# Patient Record
Sex: FEMALE | Race: OTHER | HISPANIC OR LATINO
[De-identification: names, ages, dates, MRNs, and addresses within clinical notes are randomized per-mention and may not be internally consistent; named-entity substitution may affect disease eponyms.]

---

## 2017-01-05 ENCOUNTER — APPOINTMENT (OUTPATIENT)
Dept: INTERNAL MEDICINE | Facility: CLINIC | Age: 22
End: 2017-01-05

## 2017-01-05 VITALS
DIASTOLIC BLOOD PRESSURE: 79 MMHG | TEMPERATURE: 98.2 F | SYSTOLIC BLOOD PRESSURE: 120 MMHG | HEART RATE: 80 BPM | RESPIRATION RATE: 12 BRPM | HEIGHT: 61 IN | WEIGHT: 184 LBS | OXYGEN SATURATION: 99 % | BODY MASS INDEX: 34.74 KG/M2

## 2017-01-05 DIAGNOSIS — M25.512 PAIN IN LEFT SHOULDER: ICD-10-CM

## 2017-01-05 DIAGNOSIS — J30.9 ALLERGIC RHINITIS, UNSPECIFIED: ICD-10-CM

## 2017-01-05 DIAGNOSIS — R49.0 DYSPHONIA: ICD-10-CM

## 2017-01-05 RX ORDER — AZELASTINE HYDROCHLORIDE 205.5 UG/1
0.15 SPRAY, METERED NASAL 3 TIMES DAILY
Qty: 1 | Refills: 0 | Status: ACTIVE | COMMUNITY
Start: 2017-01-05 | End: 1900-01-01

## 2017-01-05 RX ORDER — MONTELUKAST 10 MG/1
10 TABLET, FILM COATED ORAL DAILY
Qty: 30 | Refills: 0 | Status: ACTIVE | COMMUNITY
Start: 2017-01-05 | End: 1900-01-01

## 2017-01-18 ENCOUNTER — APPOINTMENT (OUTPATIENT)
Dept: ORTHOPEDIC SURGERY | Facility: CLINIC | Age: 22
End: 2017-01-18

## 2017-08-15 ENCOUNTER — EMERGENCY (EMERGENCY)
Facility: HOSPITAL | Age: 22
LOS: 1 days | Discharge: HOME | End: 2017-08-15
Attending: EMERGENCY MEDICINE | Admitting: EMERGENCY MEDICINE
Payer: COMMERCIAL

## 2017-08-15 VITALS
SYSTOLIC BLOOD PRESSURE: 120 MMHG | OXYGEN SATURATION: 98 % | RESPIRATION RATE: 18 BRPM | TEMPERATURE: 98 F | DIASTOLIC BLOOD PRESSURE: 62 MMHG | HEART RATE: 85 BPM

## 2017-08-15 LAB
ALBUMIN SERPL ELPH-MCNC: 4.3 G/DL — SIGNIFICANT CHANGE UP (ref 3.3–5)
ALP SERPL-CCNC: 101 U/L — SIGNIFICANT CHANGE UP (ref 40–120)
ALT FLD-CCNC: 20 U/L — SIGNIFICANT CHANGE UP (ref 4–33)
APPEARANCE UR: CLEAR — SIGNIFICANT CHANGE UP
AST SERPL-CCNC: 22 U/L — SIGNIFICANT CHANGE UP (ref 4–32)
BASE EXCESS BLDV CALC-SCNC: 4.1 MMOL/L — SIGNIFICANT CHANGE UP
BASOPHILS # BLD AUTO: 0.03 K/UL — SIGNIFICANT CHANGE UP (ref 0–0.2)
BASOPHILS NFR BLD AUTO: 0.3 % — SIGNIFICANT CHANGE UP (ref 0–2)
BILIRUB SERPL-MCNC: 0.3 MG/DL — SIGNIFICANT CHANGE UP (ref 0.2–1.2)
BILIRUB UR-MCNC: NEGATIVE — SIGNIFICANT CHANGE UP
BLOOD GAS VENOUS - CREATININE: 0.69 MG/DL — SIGNIFICANT CHANGE UP (ref 0.5–1.3)
BLOOD UR QL VISUAL: NEGATIVE — SIGNIFICANT CHANGE UP
BUN SERPL-MCNC: 13 MG/DL — SIGNIFICANT CHANGE UP (ref 7–23)
CALCIUM SERPL-MCNC: 9.5 MG/DL — SIGNIFICANT CHANGE UP (ref 8.4–10.5)
CHLORIDE BLDV-SCNC: 99 MMOL/L — SIGNIFICANT CHANGE UP (ref 96–108)
CHLORIDE SERPL-SCNC: 99 MMOL/L — SIGNIFICANT CHANGE UP (ref 98–107)
CO2 SERPL-SCNC: 25 MMOL/L — SIGNIFICANT CHANGE UP (ref 22–31)
COLOR SPEC: SIGNIFICANT CHANGE UP
CREAT SERPL-MCNC: 0.7 MG/DL — SIGNIFICANT CHANGE UP (ref 0.5–1.3)
EOSINOPHIL # BLD AUTO: 0.13 K/UL — SIGNIFICANT CHANGE UP (ref 0–0.5)
EOSINOPHIL NFR BLD AUTO: 1.3 % — SIGNIFICANT CHANGE UP (ref 0–6)
GAS PNL BLDV: 137 MMOL/L — SIGNIFICANT CHANGE UP (ref 136–146)
GLUCOSE BLDV-MCNC: 98 — SIGNIFICANT CHANGE UP (ref 70–99)
GLUCOSE SERPL-MCNC: 88 MG/DL — SIGNIFICANT CHANGE UP (ref 70–99)
GLUCOSE UR-MCNC: NEGATIVE — SIGNIFICANT CHANGE UP
HCG UR-SCNC: NEGATIVE — SIGNIFICANT CHANGE UP
HCO3 BLDV-SCNC: 27 MMOL/L — SIGNIFICANT CHANGE UP (ref 20–27)
HCT VFR BLD CALC: 43 % — SIGNIFICANT CHANGE UP (ref 34.5–45)
HCT VFR BLDV CALC: 44.5 % — SIGNIFICANT CHANGE UP (ref 34.5–45)
HGB BLD-MCNC: 14.2 G/DL — SIGNIFICANT CHANGE UP (ref 11.5–15.5)
HGB BLDV-MCNC: 14.5 G/DL — SIGNIFICANT CHANGE UP (ref 11.5–15.5)
IMM GRANULOCYTES # BLD AUTO: 0.06 # — SIGNIFICANT CHANGE UP
IMM GRANULOCYTES NFR BLD AUTO: 0.6 % — SIGNIFICANT CHANGE UP (ref 0–1.5)
KETONES UR-MCNC: NEGATIVE — SIGNIFICANT CHANGE UP
LACTATE BLDV-MCNC: 1.2 MMOL/L — SIGNIFICANT CHANGE UP (ref 0.5–2)
LEUKOCYTE ESTERASE UR-ACNC: NEGATIVE — SIGNIFICANT CHANGE UP
LIDOCAIN IGE QN: 41.1 U/L — SIGNIFICANT CHANGE UP (ref 7–60)
LYMPHOCYTES # BLD AUTO: 2.85 K/UL — SIGNIFICANT CHANGE UP (ref 1–3.3)
LYMPHOCYTES # BLD AUTO: 28.1 % — SIGNIFICANT CHANGE UP (ref 13–44)
MCHC RBC-ENTMCNC: 27.4 PG — SIGNIFICANT CHANGE UP (ref 27–34)
MCHC RBC-ENTMCNC: 33 % — SIGNIFICANT CHANGE UP (ref 32–36)
MCV RBC AUTO: 83 FL — SIGNIFICANT CHANGE UP (ref 80–100)
MONOCYTES # BLD AUTO: 0.57 K/UL — SIGNIFICANT CHANGE UP (ref 0–0.9)
MONOCYTES NFR BLD AUTO: 5.6 % — SIGNIFICANT CHANGE UP (ref 2–14)
MUCOUS THREADS # UR AUTO: SIGNIFICANT CHANGE UP
NEUTROPHILS # BLD AUTO: 6.51 K/UL — SIGNIFICANT CHANGE UP (ref 1.8–7.4)
NEUTROPHILS NFR BLD AUTO: 64.1 % — SIGNIFICANT CHANGE UP (ref 43–77)
NITRITE UR-MCNC: NEGATIVE — SIGNIFICANT CHANGE UP
NRBC # FLD: 0 — SIGNIFICANT CHANGE UP
PCO2 BLDV: 46 MMHG — SIGNIFICANT CHANGE UP (ref 41–51)
PH BLDV: 7.41 PH — SIGNIFICANT CHANGE UP (ref 7.32–7.43)
PH UR: 5.5 — SIGNIFICANT CHANGE UP (ref 4.6–8)
PLATELET # BLD AUTO: 323 K/UL — SIGNIFICANT CHANGE UP (ref 150–400)
PMV BLD: 10.1 FL — SIGNIFICANT CHANGE UP (ref 7–13)
PO2 BLDV: 32 MMHG — LOW (ref 35–40)
POTASSIUM BLDV-SCNC: 3 MMOL/L — LOW (ref 3.4–4.5)
POTASSIUM SERPL-MCNC: 3.4 MMOL/L — LOW (ref 3.5–5.3)
POTASSIUM SERPL-SCNC: 3.4 MMOL/L — LOW (ref 3.5–5.3)
PROT SERPL-MCNC: 7.6 G/DL — SIGNIFICANT CHANGE UP (ref 6–8.3)
PROT UR-MCNC: NEGATIVE — SIGNIFICANT CHANGE UP
RBC # BLD: 5.18 M/UL — SIGNIFICANT CHANGE UP (ref 3.8–5.2)
RBC # FLD: 13.4 % — SIGNIFICANT CHANGE UP (ref 10.3–14.5)
RBC CASTS # UR COMP ASSIST: SIGNIFICANT CHANGE UP (ref 0–?)
SAO2 % BLDV: 57.4 % — LOW (ref 60–85)
SODIUM SERPL-SCNC: 139 MMOL/L — SIGNIFICANT CHANGE UP (ref 135–145)
SP GR SPEC: 1.01 — SIGNIFICANT CHANGE UP (ref 1–1.03)
SQUAMOUS # UR AUTO: SIGNIFICANT CHANGE UP
UROBILINOGEN FLD QL: NORMAL E.U. — SIGNIFICANT CHANGE UP (ref 0.1–0.2)
WBC # BLD: 10.15 K/UL — SIGNIFICANT CHANGE UP (ref 3.8–10.5)
WBC # FLD AUTO: 10.15 K/UL — SIGNIFICANT CHANGE UP (ref 3.8–10.5)
WBC UR QL: SIGNIFICANT CHANGE UP (ref 0–?)

## 2017-08-15 PROCEDURE — 99284 EMERGENCY DEPT VISIT MOD MDM: CPT

## 2017-08-15 PROCEDURE — 74176 CT ABD & PELVIS W/O CONTRAST: CPT | Mod: 26

## 2017-08-15 RX ORDER — KETOROLAC TROMETHAMINE 30 MG/ML
30 SYRINGE (ML) INJECTION ONCE
Qty: 0 | Refills: 0 | Status: DISCONTINUED | OUTPATIENT
Start: 2017-08-15 | End: 2017-08-15

## 2017-08-15 RX ORDER — SODIUM CHLORIDE 9 MG/ML
1000 INJECTION INTRAMUSCULAR; INTRAVENOUS; SUBCUTANEOUS ONCE
Qty: 0 | Refills: 0 | Status: COMPLETED | OUTPATIENT
Start: 2017-08-15 | End: 2017-08-15

## 2017-08-15 RX ORDER — FAMOTIDINE 10 MG/ML
20 INJECTION INTRAVENOUS ONCE
Qty: 0 | Refills: 0 | Status: COMPLETED | OUTPATIENT
Start: 2017-08-15 | End: 2017-08-15

## 2017-08-15 RX ADMIN — Medication 30 MILLIGRAM(S): at 17:45

## 2017-08-15 RX ADMIN — SODIUM CHLORIDE 1000 MILLILITER(S): 9 INJECTION INTRAMUSCULAR; INTRAVENOUS; SUBCUTANEOUS at 15:33

## 2017-08-15 RX ADMIN — Medication 30 MILLIGRAM(S): at 17:30

## 2017-08-15 RX ADMIN — Medication 30 MILLILITER(S): at 15:33

## 2017-08-15 RX ADMIN — FAMOTIDINE 20 MILLIGRAM(S): 10 INJECTION INTRAVENOUS at 15:33

## 2017-08-15 NOTE — ED PROVIDER NOTE - PLAN OF CARE
You were seen in the emergency department today due to stomach pain. You were given fluids and  medications to help with gastritis related symptoms. Your CT scan was negative. Your blood work did not sure an infection. Please follow up with your Primary MD in 24-48 hr. Seek immediate medical care for any new/worsening signs or symptoms including fever, chest pain, shortness of breathing, or worsening abdominal pain.

## 2017-08-15 NOTE — ED PROVIDER NOTE - PROGRESS NOTE DETAILS
Pt reports epigastric pain to be gone. Still having intermittent sharp pain in the LUQ/left flank. Pain 7-10 before, now 6-10. Pending CT.   Radha Onofre, PGY-1 EM

## 2017-08-15 NOTE — ED ADULT NURSE NOTE - OBJECTIVE STATEMENT
Pt is a 22 yo female pmh chronic gastritis LMP 2 weeks ago presenting with epigastric pain and left flank pain that began yesterday. Pt reports that she had a epigastric burning sensation yesterday and is having LUQ sharp pain today that radiates to the flank. 22G IV inserted to right AC. Labs drawn and sent. MD evaluation in progress. Will cont. to monitor.

## 2017-08-15 NOTE — ED PROVIDER NOTE - MEDICAL DECISION MAKING DETAILS
Pt is a 20 y/o with chronic gastritis c/o epigastric pain and left flank pain. Tender on exam in the LUQ/flank, hx and presentation concerning for renal colic; however, no signs of UTI. Less likely splenic pathology including mono-pt has no sx of fatigue, tonsilitis/pharyngitis, fever.   #pain control, maalox/pepsid, fluids, bhcg, lipase/cbc/cmp, non-contrast CT  Radha Onofre, PGY-1 EM

## 2017-08-15 NOTE — ED PROVIDER NOTE - OBJECTIVE STATEMENT
Pt is a 20 yo female pmh chronic gastritis LMP 2 weeks ago presenting with epigastric pain and left flank pain that began yesterday. Pt reports that she had a epigastric burning sensation yesterday and is having LUQ sharp pain today that radiates to the flank. Pt reports this does not feel similar to her episodes of gastritis. Pt reports one episode of non-bloody non-bilious emesis yesterday and 3 more episodes today. Reports she is unable to keep any food down. Reports that Pt is a 20 yo female pmh chronic gastritis LMP 2 weeks ago presenting with epigastric pain and left flank pain that began yesterday. Pt reports that she had a epigastric burning sensation yesterday and is having LUQ sharp pain today that radiates to the flank. Pt reports this does not feel similar to her episodes of gastritis. Pt reports one episode of non-bloody non-bilious emesis yesterday and 3 more episodes today. Reports she is unable to keep any food down. Denies having any urinary sx including hematuria or dysuria. No hx of stones. Denies ha, loc, cp, sob, back pain, diarrhea, recent travel and sickness.

## 2017-08-15 NOTE — ED ADULT TRIAGE NOTE - CHIEF COMPLAINT QUOTE
p/t c/o of diffuse abd pain radiating to back for past few days denies any nausea or vomiting nad noted

## 2017-08-15 NOTE — ED PROVIDER NOTE - ATTENDING CONTRIBUTION TO CARE
AJM: Pt seen with resident and agree with above note. Pt is a 20 yo female pmh chronic gastritis LMP 2 weeks ago presenting with epigastric pain and left flank pain that began yesterday. Pt reports that she had a epigastric burning sensation yesterday and is having LUQ sharp pain today that radiates to the flank. Pt reports this does not feel similar to her episodes of gastritis. Pt reports one episode of non-bloody non-bilious emesis yesterday and 3 more episodes today. Reports she is unable to keep any food down. Denies having any urinary sx including hematuria or dysuria. No hx of stones. + LUQ ttp. + Left cva ttp. labs, ua, ct non con to r/o stone

## 2017-08-15 NOTE — ED PROVIDER NOTE - CARE PLAN
Principal Discharge DX:	Abdominal pain Principal Discharge DX:	Abdominal pain  Instructions for follow-up, activity and diet:	You were seen in the emergency department today due to stomach pain. You were given fluids and  medications to help with gastritis related symptoms. Your CT scan was negative. Your blood work did not sure an infection. Please follow up with your Primary MD in 24-48 hr. Seek immediate medical care for any new/worsening signs or symptoms including fever, chest pain, shortness of breathing, or worsening abdominal pain.

## 2017-08-17 ENCOUNTER — EMERGENCY (EMERGENCY)
Facility: HOSPITAL | Age: 22
LOS: 1 days | Discharge: HOME | End: 2017-08-17
Admitting: EMERGENCY MEDICINE
Payer: COMMERCIAL

## 2017-08-17 VITALS
RESPIRATION RATE: 18 BRPM | DIASTOLIC BLOOD PRESSURE: 86 MMHG | SYSTOLIC BLOOD PRESSURE: 134 MMHG | HEART RATE: 75 BPM | OXYGEN SATURATION: 100 % | TEMPERATURE: 98 F

## 2017-08-17 VITALS
DIASTOLIC BLOOD PRESSURE: 63 MMHG | RESPIRATION RATE: 18 BRPM | HEART RATE: 62 BPM | OXYGEN SATURATION: 100 % | SYSTOLIC BLOOD PRESSURE: 107 MMHG | TEMPERATURE: 98 F

## 2017-08-17 LAB
ALBUMIN SERPL ELPH-MCNC: 4.3 G/DL — SIGNIFICANT CHANGE UP (ref 3.3–5)
ALP SERPL-CCNC: 103 U/L — SIGNIFICANT CHANGE UP (ref 40–120)
ALT FLD-CCNC: 25 U/L — SIGNIFICANT CHANGE UP (ref 4–33)
APPEARANCE UR: CLEAR — SIGNIFICANT CHANGE UP
AST SERPL-CCNC: 27 U/L — SIGNIFICANT CHANGE UP (ref 4–32)
BACTERIA # UR AUTO: SIGNIFICANT CHANGE UP
BASE EXCESS BLDV CALC-SCNC: 0.4 MMOL/L — SIGNIFICANT CHANGE UP
BASOPHILS # BLD AUTO: 0.03 K/UL — SIGNIFICANT CHANGE UP (ref 0–0.2)
BASOPHILS NFR BLD AUTO: 0.3 % — SIGNIFICANT CHANGE UP (ref 0–2)
BILIRUB SERPL-MCNC: 0.3 MG/DL — SIGNIFICANT CHANGE UP (ref 0.2–1.2)
BILIRUB UR-MCNC: NEGATIVE — SIGNIFICANT CHANGE UP
BLOOD GAS VENOUS - CREATININE: 0.56 MG/DL — SIGNIFICANT CHANGE UP (ref 0.5–1.3)
BLOOD UR QL VISUAL: NEGATIVE — SIGNIFICANT CHANGE UP
BUN SERPL-MCNC: 8 MG/DL — SIGNIFICANT CHANGE UP (ref 7–23)
CALCIUM SERPL-MCNC: 9.5 MG/DL — SIGNIFICANT CHANGE UP (ref 8.4–10.5)
CHLORIDE BLDV-SCNC: 103 MMOL/L — SIGNIFICANT CHANGE UP (ref 96–108)
CHLORIDE SERPL-SCNC: 102 MMOL/L — SIGNIFICANT CHANGE UP (ref 98–107)
CO2 SERPL-SCNC: 22 MMOL/L — SIGNIFICANT CHANGE UP (ref 22–31)
COLOR SPEC: SIGNIFICANT CHANGE UP
CREAT SERPL-MCNC: 0.65 MG/DL — SIGNIFICANT CHANGE UP (ref 0.5–1.3)
EOSINOPHIL # BLD AUTO: 0.17 K/UL — SIGNIFICANT CHANGE UP (ref 0–0.5)
EOSINOPHIL NFR BLD AUTO: 1.6 % — SIGNIFICANT CHANGE UP (ref 0–6)
GAS PNL BLDV: 139 MMOL/L — SIGNIFICANT CHANGE UP (ref 136–146)
GLUCOSE BLDV-MCNC: 80 — SIGNIFICANT CHANGE UP (ref 70–99)
GLUCOSE SERPL-MCNC: 76 MG/DL — SIGNIFICANT CHANGE UP (ref 70–99)
GLUCOSE UR-MCNC: NEGATIVE — SIGNIFICANT CHANGE UP
HCO3 BLDV-SCNC: 24 MMOL/L — SIGNIFICANT CHANGE UP (ref 20–27)
HCT VFR BLD CALC: 43.1 % — SIGNIFICANT CHANGE UP (ref 34.5–45)
HCT VFR BLDV CALC: 44.1 % — SIGNIFICANT CHANGE UP (ref 34.5–45)
HGB BLD-MCNC: 14.2 G/DL — SIGNIFICANT CHANGE UP (ref 11.5–15.5)
HGB BLDV-MCNC: 14.4 G/DL — SIGNIFICANT CHANGE UP (ref 11.5–15.5)
IMM GRANULOCYTES # BLD AUTO: 0.05 # — SIGNIFICANT CHANGE UP
IMM GRANULOCYTES NFR BLD AUTO: 0.5 % — SIGNIFICANT CHANGE UP (ref 0–1.5)
KETONES UR-MCNC: NEGATIVE — SIGNIFICANT CHANGE UP
LACTATE BLDV-MCNC: 1.8 MMOL/L — SIGNIFICANT CHANGE UP (ref 0.5–2)
LEUKOCYTE ESTERASE UR-ACNC: NEGATIVE — SIGNIFICANT CHANGE UP
LIDOCAIN IGE QN: 48.9 U/L — SIGNIFICANT CHANGE UP (ref 7–60)
LYMPHOCYTES # BLD AUTO: 3.34 K/UL — HIGH (ref 1–3.3)
LYMPHOCYTES # BLD AUTO: 31.3 % — SIGNIFICANT CHANGE UP (ref 13–44)
MCHC RBC-ENTMCNC: 28.1 PG — SIGNIFICANT CHANGE UP (ref 27–34)
MCHC RBC-ENTMCNC: 32.9 % — SIGNIFICANT CHANGE UP (ref 32–36)
MCV RBC AUTO: 85.3 FL — SIGNIFICANT CHANGE UP (ref 80–100)
MONOCYTES # BLD AUTO: 0.56 K/UL — SIGNIFICANT CHANGE UP (ref 0–0.9)
MONOCYTES NFR BLD AUTO: 5.2 % — SIGNIFICANT CHANGE UP (ref 2–14)
MUCOUS THREADS # UR AUTO: SIGNIFICANT CHANGE UP
NEUTROPHILS # BLD AUTO: 6.53 K/UL — SIGNIFICANT CHANGE UP (ref 1.8–7.4)
NEUTROPHILS NFR BLD AUTO: 61.1 % — SIGNIFICANT CHANGE UP (ref 43–77)
NITRITE UR-MCNC: NEGATIVE — SIGNIFICANT CHANGE UP
NON-SQ EPI CELLS # UR AUTO: <1 — SIGNIFICANT CHANGE UP
NRBC # FLD: 0 — SIGNIFICANT CHANGE UP
PCO2 BLDV: 48 MMHG — SIGNIFICANT CHANGE UP (ref 41–51)
PH BLDV: 7.35 PH — SIGNIFICANT CHANGE UP (ref 7.32–7.43)
PH UR: 6 — SIGNIFICANT CHANGE UP (ref 4.6–8)
PLATELET # BLD AUTO: 339 K/UL — SIGNIFICANT CHANGE UP (ref 150–400)
PMV BLD: 10.6 FL — SIGNIFICANT CHANGE UP (ref 7–13)
PO2 BLDV: 37 MMHG — SIGNIFICANT CHANGE UP (ref 35–40)
POTASSIUM BLDV-SCNC: 3.4 MMOL/L — SIGNIFICANT CHANGE UP (ref 3.4–4.5)
POTASSIUM SERPL-MCNC: 3.8 MMOL/L — SIGNIFICANT CHANGE UP (ref 3.5–5.3)
POTASSIUM SERPL-SCNC: 3.8 MMOL/L — SIGNIFICANT CHANGE UP (ref 3.5–5.3)
PROT SERPL-MCNC: 7.8 G/DL — SIGNIFICANT CHANGE UP (ref 6–8.3)
PROT UR-MCNC: NEGATIVE — SIGNIFICANT CHANGE UP
RBC # BLD: 5.05 M/UL — SIGNIFICANT CHANGE UP (ref 3.8–5.2)
RBC # FLD: 13.5 % — SIGNIFICANT CHANGE UP (ref 10.3–14.5)
RBC CASTS # UR COMP ASSIST: SIGNIFICANT CHANGE UP (ref 0–?)
SAO2 % BLDV: 69.2 % — SIGNIFICANT CHANGE UP (ref 60–85)
SODIUM SERPL-SCNC: 141 MMOL/L — SIGNIFICANT CHANGE UP (ref 135–145)
SP GR SPEC: 1.01 — SIGNIFICANT CHANGE UP (ref 1–1.03)
SQUAMOUS # UR AUTO: SIGNIFICANT CHANGE UP
UROBILINOGEN FLD QL: NORMAL E.U. — SIGNIFICANT CHANGE UP (ref 0.1–0.2)
WBC # BLD: 10.68 K/UL — HIGH (ref 3.8–10.5)
WBC # FLD AUTO: 10.68 K/UL — HIGH (ref 3.8–10.5)
WBC UR QL: SIGNIFICANT CHANGE UP (ref 0–?)

## 2017-08-17 PROCEDURE — 99285 EMERGENCY DEPT VISIT HI MDM: CPT

## 2017-08-17 PROCEDURE — 76705 ECHO EXAM OF ABDOMEN: CPT | Mod: 26

## 2017-08-17 RX ORDER — MORPHINE SULFATE 50 MG/1
2 CAPSULE, EXTENDED RELEASE ORAL ONCE
Qty: 0 | Refills: 0 | Status: DISCONTINUED | OUTPATIENT
Start: 2017-08-17 | End: 2017-08-17

## 2017-08-17 RX ORDER — LIDOCAINE 4 G/100G
10 CREAM TOPICAL ONCE
Qty: 0 | Refills: 0 | Status: COMPLETED | OUTPATIENT
Start: 2017-08-17 | End: 2017-08-17

## 2017-08-17 RX ORDER — FAMOTIDINE 10 MG/ML
20 INJECTION INTRAVENOUS ONCE
Qty: 0 | Refills: 0 | Status: COMPLETED | OUTPATIENT
Start: 2017-08-17 | End: 2017-08-17

## 2017-08-17 RX ORDER — OMEPRAZOLE 10 MG/1
1 CAPSULE, DELAYED RELEASE ORAL
Qty: 30 | Refills: 0
Start: 2017-08-17 | End: 2017-09-16

## 2017-08-17 RX ADMIN — MORPHINE SULFATE 2 MILLIGRAM(S): 50 CAPSULE, EXTENDED RELEASE ORAL at 21:57

## 2017-08-17 RX ADMIN — Medication 10 MILLILITER(S): at 18:50

## 2017-08-17 RX ADMIN — MORPHINE SULFATE 2 MILLIGRAM(S): 50 CAPSULE, EXTENDED RELEASE ORAL at 21:42

## 2017-08-17 RX ADMIN — FAMOTIDINE 20 MILLIGRAM(S): 10 INJECTION INTRAVENOUS at 18:51

## 2017-08-17 RX ADMIN — LIDOCAINE 10 MILLILITER(S): 4 CREAM TOPICAL at 18:50

## 2017-08-17 RX ADMIN — Medication 30 MILLILITER(S): at 18:49

## 2017-08-17 NOTE — ED PROVIDER NOTE - OBJECTIVE STATEMENT
Pt is 22 y/o female with PMhx of gastritis here for eval of epigastric pain x 1 week. Pt states that the pain is dull, constant, not related to eating, she had similar pain in the past and was advised that she has gastritis. Pt is concerned though as since yesterday she has had multiple episodes of NB/NB emesis, also worsening pain, denies fever, chills, dysuria, urinary urgency or frequency, denies cp, sob, melena, NSAIds use. Pt was recently seen and evaluated in the ER for the same, had neg labs as well as Ct abd/pelvis, no meds given. Pt never saw GI nor was she scoped. pcp - Dr Haile. LMP - 2 wks ago. (-) diarrhea/constipation, denies hx of intrabd sx.

## 2017-08-17 NOTE — ED PROVIDER NOTE - PLAN OF CARE
Please make sure that you take medications as advised, avoid spicy food, non-steroids antinflammatory medications,  alcohol, cigarettes, chocolate. Follow up with gastroenterologist within next 7 days. Return to ER for worsening pain, persistent vomiting, chest pain, shortness of breath.

## 2017-08-17 NOTE — ED PROVIDER NOTE - CARE PLAN
Instructions for follow-up, activity and diet:	Please make sure that you take medications as advised, avoid spicy food, non-steroids antinflammatory medications,  alcohol, cigarettes, chocolate. Follow up with gastroenterologist within next 7 days. Return to ER for worsening pain, persistent vomiting, chest pain, shortness of breath. Principal Discharge DX:	Abdominal pain  Instructions for follow-up, activity and diet:	Please make sure that you take medications as advised, avoid spicy food, non-steroids antinflammatory medications,  alcohol, cigarettes, chocolate. Follow up with gastroenterologist within next 7 days. Return to ER for worsening pain, persistent vomiting, chest pain, shortness of breath.

## 2017-08-17 NOTE — ED ADULT TRIAGE NOTE - CHIEF COMPLAINT QUOTE
Pt c/o epigastric pain x 1 week, was seen 2 days ago for same symptoms but states that pain got worse. Also c/o N/V x 5 episodes today. Denies diarrhea, fever/chills. LMP 2 weeks ago.

## 2017-08-17 NOTE — ED ADULT NURSE NOTE - CHPI ED SYMPTOMS NEG
no vomiting/no fever/no diarrhea/no chills/no dysuria/no abdominal distension/no nausea/no burning urination/no hematuria/no blood in stool

## 2017-08-31 ENCOUNTER — APPOINTMENT (OUTPATIENT)
Dept: INTERNAL MEDICINE | Facility: CLINIC | Age: 22
End: 2017-08-31
Payer: COMMERCIAL

## 2017-08-31 VITALS
SYSTOLIC BLOOD PRESSURE: 100 MMHG | OXYGEN SATURATION: 94 % | BODY MASS INDEX: 36.06 KG/M2 | HEIGHT: 61 IN | RESPIRATION RATE: 12 BRPM | HEART RATE: 61 BPM | TEMPERATURE: 98.4 F | WEIGHT: 191 LBS | DIASTOLIC BLOOD PRESSURE: 66 MMHG

## 2017-08-31 DIAGNOSIS — K21.9 GASTRO-ESOPHAGEAL REFLUX DISEASE W/OUT ESOPHAGITIS: ICD-10-CM

## 2017-08-31 DIAGNOSIS — R11.2 NAUSEA WITH VOMITING, UNSPECIFIED: ICD-10-CM

## 2017-08-31 PROCEDURE — 99214 OFFICE O/P EST MOD 30 MIN: CPT

## 2017-09-02 LAB
CHOLEST SERPL-MCNC: 136 MG/DL
CHOLEST/HDLC SERPL: 2.8 RATIO
HDLC SERPL-MCNC: 49 MG/DL
LDLC SERPL CALC-MCNC: 68 MG/DL
T4 FREE SERPL-MCNC: 1.4 NG/DL
TRIGL SERPL-MCNC: 97 MG/DL
TSH SERPL-ACNC: 0.55 UIU/ML

## 2017-09-05 LAB — 25(OH)D3 SERPL-MCNC: 22.4 NG/ML

## 2017-09-08 ENCOUNTER — APPOINTMENT (OUTPATIENT)
Dept: GASTROENTEROLOGY | Facility: CLINIC | Age: 22
End: 2017-09-08

## 2018-06-05 ENCOUNTER — APPOINTMENT (OUTPATIENT)
Dept: INTERNAL MEDICINE | Facility: CLINIC | Age: 23
End: 2018-06-05
Payer: COMMERCIAL

## 2018-06-05 VITALS
DIASTOLIC BLOOD PRESSURE: 80 MMHG | WEIGHT: 179 LBS | HEART RATE: 63 BPM | TEMPERATURE: 98.4 F | SYSTOLIC BLOOD PRESSURE: 117 MMHG | HEIGHT: 61 IN | OXYGEN SATURATION: 100 % | BODY MASS INDEX: 33.79 KG/M2 | RESPIRATION RATE: 12 BRPM

## 2018-06-05 DIAGNOSIS — H92.11 OTORRHEA, RIGHT EAR: ICD-10-CM

## 2018-06-05 DIAGNOSIS — H92.10 OTORRHEA, UNSPECIFIED EAR: ICD-10-CM

## 2018-06-05 DIAGNOSIS — H81.10 BENIGN PAROXYSMAL VERTIGO, UNSPECIFIED EAR: ICD-10-CM

## 2018-06-05 LAB
BASOPHILS # BLD AUTO: 0.01 K/UL
BASOPHILS NFR BLD AUTO: 0.2 %
EOSINOPHIL # BLD AUTO: 0.08 K/UL
EOSINOPHIL NFR BLD AUTO: 1.2 %
HCT VFR BLD CALC: 43.2 %
HGB BLD-MCNC: 13.8 G/DL
IMM GRANULOCYTES NFR BLD AUTO: 0.3 %
LYMPHOCYTES # BLD AUTO: 2.43 K/UL
LYMPHOCYTES NFR BLD AUTO: 37.9 %
MAN DIFF?: NORMAL
MCHC RBC-ENTMCNC: 28.4 PG
MCHC RBC-ENTMCNC: 31.9 GM/DL
MCV RBC AUTO: 88.9 FL
MONOCYTES # BLD AUTO: 0.42 K/UL
MONOCYTES NFR BLD AUTO: 6.6 %
NEUTROPHILS # BLD AUTO: 3.45 K/UL
NEUTROPHILS NFR BLD AUTO: 53.8 %
PLATELET # BLD AUTO: 304 K/UL
RBC # BLD: 4.86 M/UL
RBC # FLD: 13.7 %
WBC # FLD AUTO: 6.41 K/UL

## 2018-06-05 PROCEDURE — 99213 OFFICE O/P EST LOW 20 MIN: CPT

## 2018-06-05 PROCEDURE — 99395 PREV VISIT EST AGE 18-39: CPT
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
## 2018-06-07 LAB
ALBUMIN SERPL ELPH-MCNC: 4.4 G/DL
ALP BLD-CCNC: 86 U/L
ALT SERPL-CCNC: 14 U/L
ANION GAP SERPL CALC-SCNC: 18 MMOL/L
APPEARANCE: CLEAR
AST SERPL-CCNC: 22 U/L
BILIRUB SERPL-MCNC: 0.3 MG/DL
BILIRUBIN URINE: NEGATIVE
BLOOD URINE: NEGATIVE
BUN SERPL-MCNC: 11 MG/DL
CALCIUM SERPL-MCNC: 9.7 MG/DL
CHLORIDE SERPL-SCNC: 105 MMOL/L
CHOLEST SERPL-MCNC: 104 MG/DL
CHOLEST/HDLC SERPL: 2.3 RATIO
CO2 SERPL-SCNC: 17 MMOL/L
COLOR: YELLOW
CREAT SERPL-MCNC: 0.69 MG/DL
GLUCOSE QUALITATIVE U: NEGATIVE MG/DL
GLUCOSE SERPL-MCNC: 58 MG/DL
HDLC SERPL-MCNC: 46 MG/DL
KETONES URINE: NEGATIVE
LDLC SERPL CALC-MCNC: 45 MG/DL
LEUKOCYTE ESTERASE URINE: NEGATIVE
NITRITE URINE: NEGATIVE
PH URINE: 5
POTASSIUM SERPL-SCNC: 3.9 MMOL/L
PROT SERPL-MCNC: 7.6 G/DL
PROTEIN URINE: NEGATIVE MG/DL
SODIUM SERPL-SCNC: 140 MMOL/L
SPECIFIC GRAVITY URINE: 1.02
TRIGL SERPL-MCNC: 66 MG/DL
TSH SERPL-ACNC: 1.09 UIU/ML
UROBILINOGEN URINE: NEGATIVE MG/DL

## 2018-06-11 DIAGNOSIS — R76.12 NONSPECIFIC REACTION TO CELL MEDIATED IMMUNITY MEASUREMENT OF GAMMA INTERFERON ANTIGEN RESPONSE W/OUT ACTIVE TUBERCULOSIS: ICD-10-CM

## 2018-06-21 PROBLEM — R76.12 POSITIVE QUANTIFERON-TB GOLD TEST: Status: ACTIVE | Noted: 2018-06-21

## 2018-06-21 LAB
25(OH)D3 SERPL-MCNC: 22.5 NG/ML
ADJUSTED MITOGEN: >10 IU/ML
ADJUSTED TB AG: 0.56 IU/ML
M TB IFN-G BLD-IMP: POSITIVE
QUANTIFERON GOLD NIL: 0.06 IU/ML

## 2018-07-03 ENCOUNTER — APPOINTMENT (OUTPATIENT)
Dept: INTERNAL MEDICINE | Facility: CLINIC | Age: 23
End: 2018-07-03
Payer: COMMERCIAL

## 2018-07-03 VITALS
HEIGHT: 61 IN | DIASTOLIC BLOOD PRESSURE: 75 MMHG | WEIGHT: 181 LBS | OXYGEN SATURATION: 98 % | TEMPERATURE: 99 F | RESPIRATION RATE: 12 BRPM | HEART RATE: 70 BPM | SYSTOLIC BLOOD PRESSURE: 112 MMHG | BODY MASS INDEX: 34.17 KG/M2

## 2018-07-03 DIAGNOSIS — R76.11 NONSPECIFIC REACTION TO TUBERCULIN SKIN TEST W/OUT ACTIVE TUBERCULOSIS: ICD-10-CM

## 2018-07-03 PROCEDURE — 99213 OFFICE O/P EST LOW 20 MIN: CPT

## 2018-07-03 RX ORDER — ISONIAZID 300 MG/1
300 TABLET ORAL DAILY
Qty: 90 | Refills: 0 | Status: DISCONTINUED | COMMUNITY
Start: 2018-07-03 | End: 2018-07-03
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
## 2018-07-03 NOTE — PHYSICAL EXAM
[No Acute Distress] : no acute distress [Well Nourished] : well nourished [Well Developed] : well developed [No Respiratory Distress] : no respiratory distress  [Clear to Auscultation] : lungs were clear to auscultation bilaterally [No Accessory Muscle Use] : no accessory muscle use

## 2018-08-13 ENCOUNTER — MEDICATION RENEWAL (OUTPATIENT)
Age: 23
End: 2018-08-13

## 2020-06-04 ENCOUNTER — TRANSCRIPTION ENCOUNTER (OUTPATIENT)
Age: 25
End: 2020-06-04

## 2020-06-04 PROBLEM — K29.70 GASTRITIS, UNSPECIFIED, WITHOUT BLEEDING: Chronic | Status: ACTIVE | Noted: 2017-08-15

## 2020-07-16 ENCOUNTER — APPOINTMENT (OUTPATIENT)
Dept: INTERNAL MEDICINE | Facility: CLINIC | Age: 25
End: 2020-07-16
Payer: COMMERCIAL

## 2020-07-16 VITALS
RESPIRATION RATE: 17 BRPM | SYSTOLIC BLOOD PRESSURE: 111 MMHG | WEIGHT: 210 LBS | OXYGEN SATURATION: 97 % | TEMPERATURE: 98.1 F | DIASTOLIC BLOOD PRESSURE: 76 MMHG | HEART RATE: 72 BPM

## 2020-07-16 DIAGNOSIS — Z00.00 ENCOUNTER FOR GENERAL ADULT MEDICAL EXAMINATION W/OUT ABNORMAL FINDINGS: ICD-10-CM

## 2020-07-16 DIAGNOSIS — E66.9 OBESITY, UNSPECIFIED: ICD-10-CM

## 2020-07-16 PROCEDURE — 99213 OFFICE O/P EST LOW 20 MIN: CPT

## 2020-07-16 PROCEDURE — 99395 PREV VISIT EST AGE 18-39: CPT | Mod: 25

## 2020-07-16 RX ORDER — RIFAMPIN 300 MG/1
300 CAPSULE ORAL DAILY
Qty: 120 | Refills: 0 | Status: DISCONTINUED | COMMUNITY
Start: 2018-07-03 | End: 2020-07-16

## 2020-07-16 NOTE — REVIEW OF SYSTEMS
[Recent Change In Weight] : ~T recent weight change [Dysmenorrhea] : dysmenorrhea [Muscle Pain] : muscle pain [Negative] : Heme/Lymph

## 2020-07-16 NOTE — ASSESSMENT
[FreeTextEntry1] : CPE OF 24 Y OLD FEM WITH WORSENING OBESITY = LABS,SEE DIETICIAN \par IRREGULAR MENSES= TSH ,LH AND TESTOSTERONE,FU GYN \par ? CTS= RHEUMA AND NEURO

## 2020-07-16 NOTE — PHYSICAL EXAM
[No Acute Distress] : no acute distress [Well-Appearing] : well-appearing [Well Developed] : well developed [Well Nourished] : well nourished [PERRL] : pupils equal round and reactive to light [EOMI] : extraocular movements intact [Normal Sclera/Conjunctiva] : normal sclera/conjunctiva [Normal Oropharynx] : the oropharynx was normal [Normal Outer Ear/Nose] : the outer ears and nose were normal in appearance [No JVD] : no jugular venous distention [No Lymphadenopathy] : no lymphadenopathy [Supple] : supple [Thyroid Normal, No Nodules] : the thyroid was normal and there were no nodules present [Clear to Auscultation] : lungs were clear to auscultation bilaterally [No Respiratory Distress] : no respiratory distress  [No Accessory Muscle Use] : no accessory muscle use [Normal Rate] : normal rate  [Regular Rhythm] : with a regular rhythm [No Murmur] : no murmur heard [Normal S1, S2] : normal S1 and S2 [No Varicosities] : no varicosities [No Abdominal Bruit] : a ~M bruit was not heard ~T in the abdomen [No Carotid Bruits] : no carotid bruits [No Palpable Aorta] : no palpable aorta [No Edema] : there was no peripheral edema [Pedal Pulses Present] : the pedal pulses are present [No Extremity Clubbing/Cyanosis] : no extremity clubbing/cyanosis [Non Tender] : non-tender [Soft] : abdomen soft [Non-distended] : non-distended [No HSM] : no HSM [No Masses] : no abdominal mass palpated [Normal Bowel Sounds] : normal bowel sounds [Normal Posterior Cervical Nodes] : no posterior cervical lymphadenopathy [Normal Anterior Cervical Nodes] : no anterior cervical lymphadenopathy [No CVA Tenderness] : no CVA  tenderness [No Spinal Tenderness] : no spinal tenderness [No Rash] : no rash [No Joint Swelling] : no joint swelling [Grossly Normal Strength/Tone] : grossly normal strength/tone [No Focal Deficits] : no focal deficits [Coordination Grossly Intact] : coordination grossly intact [Normal Gait] : normal gait [Normal Insight/Judgement] : insight and judgment were intact [Deep Tendon Reflexes (DTR)] : deep tendon reflexes were 2+ and symmetric [Normal Affect] : the affect was normal [de-identified] : OBESE
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
## 2020-07-17 LAB
25(OH)D3 SERPL-MCNC: 16.7 NG/ML
ALBUMIN SERPL ELPH-MCNC: 4.6 G/DL
ALP BLD-CCNC: 115 U/L
ALT SERPL-CCNC: 24 U/L
ANION GAP SERPL CALC-SCNC: 19 MMOL/L
AST SERPL-CCNC: 23 U/L
BASOPHILS # BLD AUTO: 0.04 K/UL
BASOPHILS NFR BLD AUTO: 0.4 %
BILIRUB SERPL-MCNC: 0.2 MG/DL
BUN SERPL-MCNC: 10 MG/DL
CALCIUM SERPL-MCNC: 9.3 MG/DL
CHLORIDE SERPL-SCNC: 106 MMOL/L
CO2 SERPL-SCNC: 20 MMOL/L
CREAT SERPL-MCNC: 0.68 MG/DL
EOSINOPHIL # BLD AUTO: 0.19 K/UL
EOSINOPHIL NFR BLD AUTO: 2.1 %
FSH SERPL-MCNC: 5.3 IU/L
GLUCOSE SERPL-MCNC: 76 MG/DL
HCT VFR BLD CALC: 44.2 %
HGB BLD-MCNC: 13.4 G/DL
IMM GRANULOCYTES NFR BLD AUTO: 0.6 %
LH SERPL-ACNC: 6 IU/L
LYMPHOCYTES # BLD AUTO: 2.89 K/UL
LYMPHOCYTES NFR BLD AUTO: 31.9 %
MAN DIFF?: NORMAL
MCHC RBC-ENTMCNC: 28 PG
MCHC RBC-ENTMCNC: 30.3 GM/DL
MCV RBC AUTO: 92.5 FL
MONOCYTES # BLD AUTO: 0.58 K/UL
MONOCYTES NFR BLD AUTO: 6.4 %
NEUTROPHILS # BLD AUTO: 5.32 K/UL
NEUTROPHILS NFR BLD AUTO: 58.6 %
PLATELET # BLD AUTO: 356 K/UL
POTASSIUM SERPL-SCNC: 4 MMOL/L
PROT SERPL-MCNC: 7.6 G/DL
RBC # BLD: 4.78 M/UL
RBC # FLD: 13.1 %
SODIUM SERPL-SCNC: 145 MMOL/L
TSH SERPL-ACNC: 0.79 UIU/ML
VIT B12 SERPL-MCNC: 490 PG/ML
WBC # FLD AUTO: 9.07 K/UL

## 2020-07-20 LAB
TESTOST BND SERPL-MCNC: 0.9 PG/ML
TESTOST SERPL-MCNC: 15.1 NG/DL

## 2020-07-22 LAB
APPEARANCE: CLEAR
BILIRUBIN URINE: NEGATIVE
BLOOD URINE: NEGATIVE
CHOLEST SERPL-MCNC: 146 MG/DL
CHOLEST/HDLC SERPL: 3.1 RATIO
COLOR: YELLOW
GLUCOSE QUALITATIVE U: NEGATIVE
HDLC SERPL-MCNC: 47 MG/DL
KETONES URINE: NEGATIVE
LDLC SERPL CALC-MCNC: 60 MG/DL
LEUKOCYTE ESTERASE URINE: NEGATIVE
NITRITE URINE: NEGATIVE
PH URINE: 6
PROTEIN URINE: NORMAL
SPECIFIC GRAVITY URINE: 1.03
TRIGL SERPL-MCNC: 193 MG/DL
UROBILINOGEN URINE: NORMAL

## 2020-08-30 ENCOUNTER — EMERGENCY (EMERGENCY)
Facility: HOSPITAL | Age: 25
LOS: 1 days | Discharge: HOME | End: 2020-08-30
Attending: EMERGENCY MEDICINE
Payer: COMMERCIAL

## 2020-08-30 VITALS
TEMPERATURE: 99 F | OXYGEN SATURATION: 97 % | DIASTOLIC BLOOD PRESSURE: 78 MMHG | RESPIRATION RATE: 18 BRPM | SYSTOLIC BLOOD PRESSURE: 111 MMHG | HEART RATE: 86 BPM

## 2020-08-30 VITALS
HEART RATE: 108 BPM | SYSTOLIC BLOOD PRESSURE: 145 MMHG | TEMPERATURE: 99 F | DIASTOLIC BLOOD PRESSURE: 78 MMHG | WEIGHT: 210.1 LBS | OXYGEN SATURATION: 99 % | RESPIRATION RATE: 18 BRPM | HEIGHT: 61 IN

## 2020-08-30 LAB
ALBUMIN SERPL ELPH-MCNC: 3.7 G/DL — SIGNIFICANT CHANGE UP (ref 3.5–5)
ALP SERPL-CCNC: 110 U/L — SIGNIFICANT CHANGE UP (ref 40–120)
ALT FLD-CCNC: 27 U/L DA — SIGNIFICANT CHANGE UP (ref 10–60)
ANION GAP SERPL CALC-SCNC: 2 MMOL/L — LOW (ref 5–17)
AST SERPL-CCNC: 15 U/L — SIGNIFICANT CHANGE UP (ref 10–40)
BASOPHILS # BLD AUTO: 0.03 K/UL — SIGNIFICANT CHANGE UP (ref 0–0.2)
BASOPHILS NFR BLD AUTO: 0.3 % — SIGNIFICANT CHANGE UP (ref 0–2)
BILIRUB SERPL-MCNC: 0.3 MG/DL — SIGNIFICANT CHANGE UP (ref 0.2–1.2)
BUN SERPL-MCNC: 7 MG/DL — SIGNIFICANT CHANGE UP (ref 7–18)
CALCIUM SERPL-MCNC: 8.9 MG/DL — SIGNIFICANT CHANGE UP (ref 8.4–10.5)
CHLORIDE SERPL-SCNC: 113 MMOL/L — HIGH (ref 96–108)
CO2 SERPL-SCNC: 26 MMOL/L — SIGNIFICANT CHANGE UP (ref 22–31)
CREAT SERPL-MCNC: 0.76 MG/DL — SIGNIFICANT CHANGE UP (ref 0.5–1.3)
EOSINOPHIL # BLD AUTO: 0.11 K/UL — SIGNIFICANT CHANGE UP (ref 0–0.5)
EOSINOPHIL NFR BLD AUTO: 1.1 % — SIGNIFICANT CHANGE UP (ref 0–6)
GLUCOSE SERPL-MCNC: 91 MG/DL — SIGNIFICANT CHANGE UP (ref 70–99)
HCG SERPL-ACNC: <1 MIU/ML — SIGNIFICANT CHANGE UP
HCT VFR BLD CALC: 43.1 % — SIGNIFICANT CHANGE UP (ref 34.5–45)
HGB BLD-MCNC: 14.2 G/DL — SIGNIFICANT CHANGE UP (ref 11.5–15.5)
IMM GRANULOCYTES NFR BLD AUTO: 0.4 % — SIGNIFICANT CHANGE UP (ref 0–1.5)
LYMPHOCYTES # BLD AUTO: 2.18 K/UL — SIGNIFICANT CHANGE UP (ref 1–3.3)
LYMPHOCYTES # BLD AUTO: 21.9 % — SIGNIFICANT CHANGE UP (ref 13–44)
MCHC RBC-ENTMCNC: 28.6 PG — SIGNIFICANT CHANGE UP (ref 27–34)
MCHC RBC-ENTMCNC: 32.9 GM/DL — SIGNIFICANT CHANGE UP (ref 32–36)
MCV RBC AUTO: 86.9 FL — SIGNIFICANT CHANGE UP (ref 80–100)
MONOCYTES # BLD AUTO: 0.61 K/UL — SIGNIFICANT CHANGE UP (ref 0–0.9)
MONOCYTES NFR BLD AUTO: 6.1 % — SIGNIFICANT CHANGE UP (ref 2–14)
NEUTROPHILS # BLD AUTO: 6.97 K/UL — SIGNIFICANT CHANGE UP (ref 1.8–7.4)
NEUTROPHILS NFR BLD AUTO: 70.2 % — SIGNIFICANT CHANGE UP (ref 43–77)
NRBC # BLD: 0 /100 WBCS — SIGNIFICANT CHANGE UP (ref 0–0)
PLATELET # BLD AUTO: 322 K/UL — SIGNIFICANT CHANGE UP (ref 150–400)
POTASSIUM SERPL-MCNC: 3.6 MMOL/L — SIGNIFICANT CHANGE UP (ref 3.5–5.3)
POTASSIUM SERPL-SCNC: 3.6 MMOL/L — SIGNIFICANT CHANGE UP (ref 3.5–5.3)
PROT SERPL-MCNC: 7.9 G/DL — SIGNIFICANT CHANGE UP (ref 6–8.3)
RBC # BLD: 4.96 M/UL — SIGNIFICANT CHANGE UP (ref 3.8–5.2)
RBC # FLD: 13 % — SIGNIFICANT CHANGE UP (ref 10.3–14.5)
SODIUM SERPL-SCNC: 141 MMOL/L — SIGNIFICANT CHANGE UP (ref 135–145)
WBC # BLD: 9.94 K/UL — SIGNIFICANT CHANGE UP (ref 3.8–10.5)
WBC # FLD AUTO: 9.94 K/UL — SIGNIFICANT CHANGE UP (ref 3.8–10.5)

## 2020-08-30 PROCEDURE — 99285 EMERGENCY DEPT VISIT HI MDM: CPT | Mod: 25

## 2020-08-30 PROCEDURE — 96374 THER/PROPH/DIAG INJ IV PUSH: CPT

## 2020-08-30 PROCEDURE — 96375 TX/PRO/DX INJ NEW DRUG ADDON: CPT

## 2020-08-30 PROCEDURE — 80053 COMPREHEN METABOLIC PANEL: CPT

## 2020-08-30 PROCEDURE — 84702 CHORIONIC GONADOTROPIN TEST: CPT

## 2020-08-30 PROCEDURE — 99284 EMERGENCY DEPT VISIT MOD MDM: CPT | Mod: 25

## 2020-08-30 PROCEDURE — 70450 CT HEAD/BRAIN W/O DYE: CPT | Mod: 26

## 2020-08-30 PROCEDURE — 93005 ELECTROCARDIOGRAM TRACING: CPT

## 2020-08-30 PROCEDURE — 85027 COMPLETE CBC AUTOMATED: CPT

## 2020-08-30 PROCEDURE — 82962 GLUCOSE BLOOD TEST: CPT

## 2020-08-30 PROCEDURE — 70450 CT HEAD/BRAIN W/O DYE: CPT

## 2020-08-30 PROCEDURE — 36415 COLL VENOUS BLD VENIPUNCTURE: CPT

## 2020-08-30 RX ORDER — KETOROLAC TROMETHAMINE 30 MG/ML
15 SYRINGE (ML) INJECTION ONCE
Refills: 0 | Status: DISCONTINUED | OUTPATIENT
Start: 2020-08-30 | End: 2020-08-30

## 2020-08-30 RX ORDER — ACETAMINOPHEN 500 MG
650 TABLET ORAL ONCE
Refills: 0 | Status: COMPLETED | OUTPATIENT
Start: 2020-08-30 | End: 2020-08-30

## 2020-08-30 RX ORDER — DEXAMETHASONE 0.5 MG/5ML
6 ELIXIR ORAL ONCE
Refills: 0 | Status: COMPLETED | OUTPATIENT
Start: 2020-08-30 | End: 2020-08-30

## 2020-08-30 RX ORDER — METOCLOPRAMIDE HCL 10 MG
10 TABLET ORAL ONCE
Refills: 0 | Status: COMPLETED | OUTPATIENT
Start: 2020-08-30 | End: 2020-08-30

## 2020-08-30 RX ADMIN — Medication 15 MILLIGRAM(S): at 18:26

## 2020-08-30 RX ADMIN — Medication 650 MILLIGRAM(S): at 16:41

## 2020-08-30 RX ADMIN — Medication 6 MILLIGRAM(S): at 18:26

## 2020-08-30 RX ADMIN — Medication 10 MILLIGRAM(S): at 17:42

## 2020-08-30 RX ADMIN — Medication 650 MILLIGRAM(S): at 17:28

## 2020-08-30 NOTE — ED PROVIDER NOTE - CLINICAL SUMMARY MEDICAL DECISION MAKING FREE TEXT BOX
hx of migraines now with headache and pt is asking for head ct as she was told large pitituitary gland    normal neuro examintation  some redness on face no ithcing  subconjuctival hemmorage in left eye will chek labs including platlets and cbc

## 2020-08-30 NOTE — ED PROVIDER NOTE - NSFOLLOWUPINSTRUCTIONS_ED_ALL_ED_FT
Migraine Headache  A migraine headache is an intense, throbbing pain on one side or both sides of the head. Migraine headaches may also cause other symptoms, such as nausea, vomiting, and sensitivity to light and noise. A migraine headache can last from 4 hours to 3 days. Talk with your doctor about what things may bring on (trigger) your migraine headaches.  What are the causes?  The exact cause of this condition is not known. However, a migraine may be caused when nerves in the brain become irritated and release chemicals that cause inflammation of blood vessels. This inflammation causes pain. This condition may be triggered or caused by:  Drinking alcohol.Smoking.Taking medicines, such as:  Medicine used to treat chest pain (nitroglycerin).Birth control pills.Estrogen.Certain blood pressure medicines.Eating or drinking products that contain nitrates, glutamate, aspartame, or tyramine. Aged cheeses, chocolate, or caffeine may also be triggers.Doing physical activity.Other things that may trigger a migraine headache include:  Menstruation.Pregnancy.Hunger.Stress.Lack of sleep or too much sleep.Weather changes.Fatigue.What increases the risk?  The following factors may make you more likely to experience migraine headaches:  Being a certain age. This condition is more common in people who are 25–55 years old.Being female.Having a family history of migraine headaches.Being .Having a mental health condition, such as depression or anxiety.Being obese.What are the signs or symptoms?  The main symptom of this condition is pulsating or throbbing pain. This pain may:  Happen in any area of the head, such as on one side or both sides.Interfere with daily activities.Get worse with physical activity.Get worse with exposure to bright lights or loud noises.Other symptoms may include:  Nausea.Vomiting.Dizziness.General sensitivity to bright lights, loud noises, or smells.Before you get a migraine headache, you may get warning signs (an aura). An aura may include:  Seeing flashing lights or having blind spots.Seeing bright spots, halos, or zigzag lines.Having tunnel vision or blurred vision.Having numbness or a tingling feeling.Having trouble talking.Having muscle weakness.Some people have symptoms after a migraine headache (postdromal phase), such as:  Feeling tired.Difficulty concentrating.How is this diagnosed?  A migraine headache can be diagnosed based on:  Your symptoms.A physical exam.Tests, such as:   CT scan or an MRI of the head. These imaging tests can help rule out other causes of headaches.Taking fluid from the spine (lumbar puncture) and analyzing it (cerebrospinal fluid analysis, or CSF analysis).How is this treated?  This condition may be treated with medicines that:  Relieve pain.Relieve nausea.Prevent migraine headaches.Treatment for this condition may also include:  Acupuncture.Lifestyle changes like avoiding foods that trigger migraine headaches.Biofeedback.Cognitive behavioral therapy.Follow these instructions at home:  Medicines     Take over-the-counter and prescription medicines only as told by your health care provider.Ask your health care provider if the medicine prescribed to you:  Requires you to avoid driving or using heavy machinery.Can cause constipation. You may need to take these actions to prevent or treat constipation:  Drink enough fluid to keep your urine pale yellow.Take over-the-counter or prescription medicines.Eat foods that are high in fiber, such as beans, whole grains, and fresh fruits and vegetables.Limit foods that are high in fat and processed sugars, such as fried or sweet foods.Lifestyle     Do not drink alcohol.Do not use any products that contain nicotine or tobacco, such as cigarettes, e-cigarettes, and chewing tobacco. If you need help quitting, ask your health care provider.Get at least 8 hours of sleep every night.Find ways to manage stress, such as meditation, deep breathing, or yoga.General instructions         Keep a journal to find out what may trigger your migraine headaches. For example, write down:  What you eat and drink.How much sleep you get.Any change to your diet or medicines.If you have a migraine headache:  Avoid things that make your symptoms worse, such as bright lights.It may help to lie down in a dark, quiet room.Do not drive or use heavy machinery.Ask your health care provider what activities are safe for you while you are experiencing symptoms.Keep all follow-up visits as told by your health care provider. This is important.Contact a health care provider if:  You develop symptoms that are different or more severe than your usual migraine headache symptoms.You have more than 15 headache days in one month.Get help right away if:  Your migraine headache becomes severe.Your migraine headache lasts longer than 72 hours.You have a fever.You have a stiff neck.You have vision loss.Your muscles feel weak or like you cannot control them.You start to lose your balance often.You have trouble walking.You faint.You have a seizure.Summary  A migraine headache is an intense, throbbing pain on one side or both sides of the head. Migraines may also cause other symptoms, such as nausea, vomiting, and sensitivity to light and noise.This condition may be treated with medicines and lifestyle changes. You may also need to avoid certain things that trigger a migraine headache.Keep a journal to find out what may trigger your migraine headaches.Contact your health care provider if you have more than 15 headache days in a month or you develop symptoms that are different or more severe than your usual migraine headache symptoms.This information is not intended to replace advice given to you by your health care provider. Make sure you discuss any questions you have with your health care provider.    Document Released: 12/18/2006 Document Revised: 04/10/2020 Document Reviewed: 01/30/2020  Elsevier Patient Education © 2020 [employer]

## 2020-08-30 NOTE — ED PROVIDER NOTE - PATIENT PORTAL LINK FT
You can access the FollowMyHealth Patient Portal offered by [facility] by registering at the following website: [email or web address]. By joining [employer]’s FollowMyHealth portal, you will also be able to view your health information using other applications (apps) compatible with our system.

## 2020-08-30 NOTE — ED PROVIDER NOTE - OBJECTIVE STATEMENT
hx of migraines on topamax and gabapentin  today with severe headache told that she has an enlarged pituitary gland and should get a stat head ct if pain  no other nuero symptoms  felt like head was splitting and fluid coming out of her ears (but no actually fluid came out)

## 2020-08-30 NOTE — ED PROVIDER NOTE - CHPI ED SYMPTOMS NEG
no fever/no confusion/no weakness/no change in level of consciousness/no numbness/no vomiting/no nausea/no dizziness/no blurred vision/no loss of consciousness

## 2020-09-18 ENCOUNTER — EMERGENCY (EMERGENCY)
Facility: HOSPITAL | Age: 25
LOS: 1 days | Discharge: HOME | End: 2020-09-18
Attending: STUDENT IN AN ORGANIZED HEALTH CARE EDUCATION/TRAINING PROGRAM
Payer: COMMERCIAL

## 2020-09-18 VITALS
TEMPERATURE: 98 F | RESPIRATION RATE: 17 BRPM | OXYGEN SATURATION: 100 % | HEART RATE: 73 BPM | DIASTOLIC BLOOD PRESSURE: 79 MMHG | SYSTOLIC BLOOD PRESSURE: 115 MMHG

## 2020-09-18 VITALS
SYSTOLIC BLOOD PRESSURE: 128 MMHG | HEART RATE: 84 BPM | TEMPERATURE: 98 F | HEIGHT: 61 IN | RESPIRATION RATE: 15 BRPM | WEIGHT: 209 LBS | DIASTOLIC BLOOD PRESSURE: 85 MMHG | OXYGEN SATURATION: 100 %

## 2020-09-18 LAB
ALBUMIN SERPL ELPH-MCNC: 3.6 G/DL — SIGNIFICANT CHANGE UP (ref 3.5–5)
ALP SERPL-CCNC: 100 U/L — SIGNIFICANT CHANGE UP (ref 40–120)
ALT FLD-CCNC: 27 U/L DA — SIGNIFICANT CHANGE UP (ref 10–60)
ANION GAP SERPL CALC-SCNC: 4 MMOL/L — LOW (ref 5–17)
APPEARANCE UR: CLEAR — SIGNIFICANT CHANGE UP
APTT BLD: 39.9 SEC — HIGH (ref 27.5–35.5)
AST SERPL-CCNC: 17 U/L — SIGNIFICANT CHANGE UP (ref 10–40)
BASOPHILS # BLD AUTO: 0.02 K/UL — SIGNIFICANT CHANGE UP (ref 0–0.2)
BASOPHILS NFR BLD AUTO: 0.2 % — SIGNIFICANT CHANGE UP (ref 0–2)
BILIRUB DIRECT SERPL-MCNC: 0.1 MG/DL — SIGNIFICANT CHANGE UP (ref 0–0.2)
BILIRUB SERPL-MCNC: 0.2 MG/DL — SIGNIFICANT CHANGE UP (ref 0.2–1.2)
BILIRUB UR-MCNC: NEGATIVE — SIGNIFICANT CHANGE UP
BUN SERPL-MCNC: 11 MG/DL — SIGNIFICANT CHANGE UP (ref 7–18)
CALCIUM SERPL-MCNC: 8.7 MG/DL — SIGNIFICANT CHANGE UP (ref 8.4–10.5)
CHLORIDE SERPL-SCNC: 109 MMOL/L — HIGH (ref 96–108)
CO2 SERPL-SCNC: 26 MMOL/L — SIGNIFICANT CHANGE UP (ref 22–31)
COLOR SPEC: YELLOW — SIGNIFICANT CHANGE UP
CREAT SERPL-MCNC: 0.69 MG/DL — SIGNIFICANT CHANGE UP (ref 0.5–1.3)
DIFF PNL FLD: NEGATIVE — SIGNIFICANT CHANGE UP
EOSINOPHIL # BLD AUTO: 0.21 K/UL — SIGNIFICANT CHANGE UP (ref 0–0.5)
EOSINOPHIL NFR BLD AUTO: 2.5 % — SIGNIFICANT CHANGE UP (ref 0–6)
GLUCOSE SERPL-MCNC: 89 MG/DL — SIGNIFICANT CHANGE UP (ref 70–99)
GLUCOSE UR QL: NEGATIVE — SIGNIFICANT CHANGE UP
HCG SERPL-ACNC: <1 MIU/ML — SIGNIFICANT CHANGE UP
HCT VFR BLD CALC: 38.7 % — SIGNIFICANT CHANGE UP (ref 34.5–45)
HGB BLD-MCNC: 13.2 G/DL — SIGNIFICANT CHANGE UP (ref 11.5–15.5)
IMM GRANULOCYTES NFR BLD AUTO: 0.7 % — SIGNIFICANT CHANGE UP (ref 0–1.5)
INR BLD: 1.09 RATIO — SIGNIFICANT CHANGE UP (ref 0.88–1.16)
KETONES UR-MCNC: NEGATIVE — SIGNIFICANT CHANGE UP
LEUKOCYTE ESTERASE UR-ACNC: NEGATIVE — SIGNIFICANT CHANGE UP
LIDOCAIN IGE QN: 141 U/L — SIGNIFICANT CHANGE UP (ref 73–393)
LYMPHOCYTES # BLD AUTO: 3.04 K/UL — SIGNIFICANT CHANGE UP (ref 1–3.3)
LYMPHOCYTES # BLD AUTO: 36 % — SIGNIFICANT CHANGE UP (ref 13–44)
MCHC RBC-ENTMCNC: 29.3 PG — SIGNIFICANT CHANGE UP (ref 27–34)
MCHC RBC-ENTMCNC: 34.1 GM/DL — SIGNIFICANT CHANGE UP (ref 32–36)
MCV RBC AUTO: 86 FL — SIGNIFICANT CHANGE UP (ref 80–100)
MONOCYTES # BLD AUTO: 0.57 K/UL — SIGNIFICANT CHANGE UP (ref 0–0.9)
MONOCYTES NFR BLD AUTO: 6.7 % — SIGNIFICANT CHANGE UP (ref 2–14)
NEUTROPHILS # BLD AUTO: 4.55 K/UL — SIGNIFICANT CHANGE UP (ref 1.8–7.4)
NEUTROPHILS NFR BLD AUTO: 53.9 % — SIGNIFICANT CHANGE UP (ref 43–77)
NITRITE UR-MCNC: NEGATIVE — SIGNIFICANT CHANGE UP
NRBC # BLD: 0 /100 WBCS — SIGNIFICANT CHANGE UP (ref 0–0)
OB PNL STL: NEGATIVE — SIGNIFICANT CHANGE UP
PH UR: 5 — SIGNIFICANT CHANGE UP (ref 5–8)
PLATELET # BLD AUTO: 321 K/UL — SIGNIFICANT CHANGE UP (ref 150–400)
POTASSIUM SERPL-MCNC: 3.8 MMOL/L — SIGNIFICANT CHANGE UP (ref 3.5–5.3)
POTASSIUM SERPL-SCNC: 3.8 MMOL/L — SIGNIFICANT CHANGE UP (ref 3.5–5.3)
PROT SERPL-MCNC: 7.7 G/DL — SIGNIFICANT CHANGE UP (ref 6–8.3)
PROT UR-MCNC: NEGATIVE — SIGNIFICANT CHANGE UP
PROTHROM AB SERPL-ACNC: 12.7 SEC — SIGNIFICANT CHANGE UP (ref 10.6–13.6)
RBC # BLD: 4.5 M/UL — SIGNIFICANT CHANGE UP (ref 3.8–5.2)
RBC # FLD: 12.8 % — SIGNIFICANT CHANGE UP (ref 10.3–14.5)
SODIUM SERPL-SCNC: 139 MMOL/L — SIGNIFICANT CHANGE UP (ref 135–145)
SP GR SPEC: 1.02 — SIGNIFICANT CHANGE UP (ref 1.01–1.02)
TRIGL SERPL-MCNC: 106 MG/DL — SIGNIFICANT CHANGE UP (ref 10–149)
UROBILINOGEN FLD QL: NEGATIVE — SIGNIFICANT CHANGE UP
WBC # BLD: 8.45 K/UL — SIGNIFICANT CHANGE UP (ref 3.8–10.5)
WBC # FLD AUTO: 8.45 K/UL — SIGNIFICANT CHANGE UP (ref 3.8–10.5)

## 2020-09-18 PROCEDURE — 80053 COMPREHEN METABOLIC PANEL: CPT

## 2020-09-18 PROCEDURE — 81003 URINALYSIS AUTO W/O SCOPE: CPT

## 2020-09-18 PROCEDURE — 86901 BLOOD TYPING SEROLOGIC RH(D): CPT

## 2020-09-18 PROCEDURE — 99284 EMERGENCY DEPT VISIT MOD MDM: CPT | Mod: 25

## 2020-09-18 PROCEDURE — 99285 EMERGENCY DEPT VISIT HI MDM: CPT

## 2020-09-18 PROCEDURE — 36415 COLL VENOUS BLD VENIPUNCTURE: CPT

## 2020-09-18 PROCEDURE — 85610 PROTHROMBIN TIME: CPT

## 2020-09-18 PROCEDURE — 85730 THROMBOPLASTIN TIME PARTIAL: CPT

## 2020-09-18 PROCEDURE — 86850 RBC ANTIBODY SCREEN: CPT

## 2020-09-18 PROCEDURE — 82248 BILIRUBIN DIRECT: CPT

## 2020-09-18 PROCEDURE — 85025 COMPLETE CBC W/AUTO DIFF WBC: CPT

## 2020-09-18 PROCEDURE — 87086 URINE CULTURE/COLONY COUNT: CPT

## 2020-09-18 PROCEDURE — 83690 ASSAY OF LIPASE: CPT

## 2020-09-18 PROCEDURE — 76705 ECHO EXAM OF ABDOMEN: CPT | Mod: 26

## 2020-09-18 PROCEDURE — 76705 ECHO EXAM OF ABDOMEN: CPT

## 2020-09-18 PROCEDURE — 84478 ASSAY OF TRIGLYCERIDES: CPT

## 2020-09-18 PROCEDURE — 96375 TX/PRO/DX INJ NEW DRUG ADDON: CPT

## 2020-09-18 PROCEDURE — 84702 CHORIONIC GONADOTROPIN TEST: CPT

## 2020-09-18 PROCEDURE — 86900 BLOOD TYPING SEROLOGIC ABO: CPT

## 2020-09-18 PROCEDURE — 82272 OCCULT BLD FECES 1-3 TESTS: CPT

## 2020-09-18 PROCEDURE — 96374 THER/PROPH/DIAG INJ IV PUSH: CPT

## 2020-09-18 RX ORDER — FAMOTIDINE 10 MG/ML
20 INJECTION INTRAVENOUS ONCE
Refills: 0 | Status: COMPLETED | OUTPATIENT
Start: 2020-09-18 | End: 2020-09-18

## 2020-09-18 RX ORDER — MORPHINE SULFATE 50 MG/1
4 CAPSULE, EXTENDED RELEASE ORAL ONCE
Refills: 0 | Status: DISCONTINUED | OUTPATIENT
Start: 2020-09-18 | End: 2020-09-18

## 2020-09-18 RX ORDER — FAMOTIDINE 10 MG/ML
1 INJECTION INTRAVENOUS
Qty: 7 | Refills: 0
Start: 2020-09-18 | End: 2020-09-24

## 2020-09-18 RX ORDER — SUCRALFATE 1 G
1 TABLET ORAL ONCE
Refills: 0 | Status: COMPLETED | OUTPATIENT
Start: 2020-09-18 | End: 2020-09-18

## 2020-09-18 RX ORDER — SODIUM CHLORIDE 9 MG/ML
1000 INJECTION INTRAMUSCULAR; INTRAVENOUS; SUBCUTANEOUS ONCE
Refills: 0 | Status: COMPLETED | OUTPATIENT
Start: 2020-09-18 | End: 2020-09-18

## 2020-09-18 RX ORDER — ONDANSETRON 8 MG/1
4 TABLET, FILM COATED ORAL ONCE
Refills: 0 | Status: COMPLETED | OUTPATIENT
Start: 2020-09-18 | End: 2020-09-18

## 2020-09-18 RX ORDER — SUCRALFATE 1 G
1 TABLET ORAL
Qty: 21 | Refills: 0
Start: 2020-09-18 | End: 2020-09-24

## 2020-09-18 RX ADMIN — Medication 1 GRAM(S): at 21:53

## 2020-09-18 RX ADMIN — Medication 30 MILLILITER(S): at 21:53

## 2020-09-18 RX ADMIN — MORPHINE SULFATE 4 MILLIGRAM(S): 50 CAPSULE, EXTENDED RELEASE ORAL at 22:58

## 2020-09-18 RX ADMIN — FAMOTIDINE 20 MILLIGRAM(S): 10 INJECTION INTRAVENOUS at 21:53

## 2020-09-18 RX ADMIN — SODIUM CHLORIDE 1000 MILLILITER(S): 9 INJECTION INTRAMUSCULAR; INTRAVENOUS; SUBCUTANEOUS at 21:53

## 2020-09-18 RX ADMIN — ONDANSETRON 4 MILLIGRAM(S): 8 TABLET, FILM COATED ORAL at 21:53

## 2020-09-18 RX ADMIN — MORPHINE SULFATE 4 MILLIGRAM(S): 50 CAPSULE, EXTENDED RELEASE ORAL at 21:53

## 2020-09-18 NOTE — ED PROVIDER NOTE - CLINICAL SUMMARY MEDICAL DECISION MAKING FREE TEXT BOX
Patient presenting with epigastric/ruq pain. vital stable. will obtain lab, assess lft, lipase. Us, assess for gallstone. ed obs and reassess

## 2020-09-18 NOTE — ED PROVIDER NOTE - CARE PLAN
Principal Discharge DX:	Right lower quadrant abdominal pain   Principal Discharge DX:	Right upper quadrant abdominal pain

## 2020-09-18 NOTE — ED PROVIDER NOTE - OBJECTIVE STATEMENT
23 yo female with PMHx of gastritis, presents with 3 days of RUQ epigastric pain that is worse with food intake. Patient denies any fever, chills, nausea, or vomiting. 23 yo female with PMHx of gastritis, presents with 3 days of RUQ epigastric pain that is worse with food intake. Patient denies any fever, chills, nausea, or vomiting. patient also endorses dark stool.

## 2020-09-18 NOTE — ED PROVIDER NOTE - NSFOLLOWUPCLINICS_GEN_ALL_ED_FT
[facility]  Gastroenterology  [id number] [place] [address]  Phone: [phone]  Fax: [phone]  Follow Up Time:

## 2020-09-18 NOTE — ED PROVIDER NOTE - PATIENT PORTAL LINK FT
You can access the FollowMyHealth Patient Portal offered by [facility] by registering at the following website: [email or web address]. By joining [employer]’s FollowMyHealth portal, you will also be able to view your health information using other applications (apps) compatible with our system.

## 2020-09-18 NOTE — ED PROVIDER NOTE - PROGRESS NOTE DETAILS
Patient lab negative, no lft or lipase elevated. vital stable. us negative for stone. repeat exam pain improved. no peritoneal. patient endorses feeling better. given med, instructions to f.u gi, instructed to return if pain worsens, nausea, vomiting or other new symptoms. no indication for ct at this time given no peritoneal, lab wnl. patient endorse she will return if she notes worsening symptoms.

## 2020-09-20 LAB
CULTURE RESULTS: SIGNIFICANT CHANGE UP
SPECIMEN SOURCE: SIGNIFICANT CHANGE UP

## 2020-09-21 NOTE — ED POST DISCHARGE NOTE - DETAILS
aware of results. Denies any UTI symptoms. feeling better on meds for gastritis. will FU wi GI doc tomorrow

## 2020-11-04 ENCOUNTER — INPATIENT (INPATIENT)
Facility: HOSPITAL | Age: 25
LOS: 5 days | Discharge: HOME | DRG: 103 | End: 2020-11-10
Attending: INTERNAL MEDICINE | Admitting: STUDENT IN AN ORGANIZED HEALTH CARE EDUCATION/TRAINING PROGRAM
Payer: COMMERCIAL

## 2020-11-04 VITALS
TEMPERATURE: 99 F | SYSTOLIC BLOOD PRESSURE: 117 MMHG | DIASTOLIC BLOOD PRESSURE: 83 MMHG | WEIGHT: 160.06 LBS | HEART RATE: 107 BPM | OXYGEN SATURATION: 96 % | HEIGHT: 61 IN | RESPIRATION RATE: 16 BRPM
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
## 2020-11-04 DIAGNOSIS — R25.1 TREMOR, UNSPECIFIED: ICD-10-CM

## 2020-11-04 DIAGNOSIS — Z29.9 ENCOUNTER FOR PROPHYLACTIC MEASURES, UNSPECIFIED: ICD-10-CM

## 2020-11-04 DIAGNOSIS — R53.1 WEAKNESS: ICD-10-CM

## 2020-11-04 DIAGNOSIS — R51.9 HEADACHE, UNSPECIFIED: ICD-10-CM

## 2020-11-04 DIAGNOSIS — R11.0 NAUSEA: ICD-10-CM

## 2020-11-04 DIAGNOSIS — E23.7 DISORDER OF PITUITARY GLAND, UNSPECIFIED: ICD-10-CM

## 2020-11-04 LAB
ALBUMIN SERPL ELPH-MCNC: 4.7 G/DL — SIGNIFICANT CHANGE UP (ref 3.3–5)
ALP SERPL-CCNC: 111 U/L — SIGNIFICANT CHANGE UP (ref 40–120)
ALT FLD-CCNC: 15 U/L — SIGNIFICANT CHANGE UP (ref 10–45)
ANION GAP SERPL CALC-SCNC: 13 MMOL/L — SIGNIFICANT CHANGE UP (ref 5–17)
APPEARANCE CSF: CLEAR — SIGNIFICANT CHANGE UP
APPEARANCE SPUN FLD: COLORLESS — SIGNIFICANT CHANGE UP
APPEARANCE UR: CLEAR — SIGNIFICANT CHANGE UP
APTT BLD: 38.2 SEC — HIGH (ref 27.5–35.5)
AST SERPL-CCNC: 18 U/L — SIGNIFICANT CHANGE UP (ref 10–40)
BACTERIA # UR AUTO: NEGATIVE — SIGNIFICANT CHANGE UP
BASE EXCESS BLDV CALC-SCNC: 0.7 MMOL/L — SIGNIFICANT CHANGE UP (ref -2–2)
BASOPHILS # BLD AUTO: 0.02 K/UL — SIGNIFICANT CHANGE UP (ref 0–0.2)
BASOPHILS NFR BLD AUTO: 0.3 % — SIGNIFICANT CHANGE UP (ref 0–2)
BILIRUB SERPL-MCNC: 0.2 MG/DL — SIGNIFICANT CHANGE UP (ref 0.2–1.2)
BILIRUB UR-MCNC: NEGATIVE — SIGNIFICANT CHANGE UP
BUN SERPL-MCNC: 10 MG/DL — SIGNIFICANT CHANGE UP (ref 7–23)
CA-I SERPL-SCNC: 1.16 MMOL/L — SIGNIFICANT CHANGE UP (ref 1.12–1.3)
CALCIUM SERPL-MCNC: 9.8 MG/DL — SIGNIFICANT CHANGE UP (ref 8.4–10.5)
CHLORIDE BLDV-SCNC: 110 MMOL/L — HIGH (ref 96–108)
CHLORIDE SERPL-SCNC: 104 MMOL/L — SIGNIFICANT CHANGE UP (ref 96–108)
CO2 BLDV-SCNC: 27 MMOL/L — SIGNIFICANT CHANGE UP (ref 22–30)
CO2 SERPL-SCNC: 21 MMOL/L — LOW (ref 22–31)
COLOR CSF: SIGNIFICANT CHANGE UP
COLOR SPEC: COLORLESS — SIGNIFICANT CHANGE UP
CREAT SERPL-MCNC: 0.63 MG/DL — SIGNIFICANT CHANGE UP (ref 0.5–1.3)
CRP SERPL-MCNC: 0.91 MG/DL — HIGH (ref 0–0.4)
DIFF PNL FLD: NEGATIVE — SIGNIFICANT CHANGE UP
EOSINOPHIL # BLD AUTO: 0.15 K/UL — SIGNIFICANT CHANGE UP (ref 0–0.5)
EOSINOPHIL NFR BLD AUTO: 2 % — SIGNIFICANT CHANGE UP (ref 0–6)
EPI CELLS # UR: 2 /HPF — SIGNIFICANT CHANGE UP
GAS PNL BLDV: 134 MMOL/L — LOW (ref 135–145)
GAS PNL BLDV: SIGNIFICANT CHANGE UP
GAS PNL BLDV: SIGNIFICANT CHANGE UP
GLUCOSE BLDV-MCNC: 117 MG/DL — HIGH (ref 70–99)
GLUCOSE CSF-MCNC: 59 MG/DL — SIGNIFICANT CHANGE UP (ref 40–70)
GLUCOSE SERPL-MCNC: 106 MG/DL — HIGH (ref 70–99)
GLUCOSE UR QL: NEGATIVE — SIGNIFICANT CHANGE UP
GRAM STN FLD: SIGNIFICANT CHANGE UP
HCG SERPL-ACNC: <2 MIU/ML — SIGNIFICANT CHANGE UP
HCO3 BLDV-SCNC: 25 MMOL/L — SIGNIFICANT CHANGE UP (ref 21–29)
HCT VFR BLD CALC: 44.2 % — SIGNIFICANT CHANGE UP (ref 34.5–45)
HCT VFR BLDA CALC: 44 % — SIGNIFICANT CHANGE UP (ref 39–50)
HGB BLD CALC-MCNC: 14.3 G/DL — SIGNIFICANT CHANGE UP (ref 11.5–15.5)
HGB BLD-MCNC: 14.2 G/DL — SIGNIFICANT CHANGE UP (ref 11.5–15.5)
HIV 1 & 2 AB SERPL IA.RAPID: SIGNIFICANT CHANGE UP
HYALINE CASTS # UR AUTO: 0 /LPF — SIGNIFICANT CHANGE UP (ref 0–2)
IMM GRANULOCYTES NFR BLD AUTO: 0.4 % — SIGNIFICANT CHANGE UP (ref 0–1.5)
INR BLD: 0.95 RATIO — SIGNIFICANT CHANGE UP (ref 0.88–1.16)
KETONES UR-MCNC: NEGATIVE — SIGNIFICANT CHANGE UP
LACTATE BLDV-MCNC: 1.9 MMOL/L — SIGNIFICANT CHANGE UP (ref 0.7–2)
LDH CSF L TO P-CCNC: <15 U/L — SIGNIFICANT CHANGE UP
LDH FLD-CCNC: <15 U/L — SIGNIFICANT CHANGE UP
LEUKOCYTE ESTERASE UR-ACNC: NEGATIVE — SIGNIFICANT CHANGE UP
LYMPHOCYTES # BLD AUTO: 2.82 K/UL — SIGNIFICANT CHANGE UP (ref 1–3.3)
LYMPHOCYTES # BLD AUTO: 37.2 % — SIGNIFICANT CHANGE UP (ref 13–44)
LYMPHOCYTES # CSF: 86 % — HIGH (ref 40–80)
MCHC RBC-ENTMCNC: 28.2 PG — SIGNIFICANT CHANGE UP (ref 27–34)
MCHC RBC-ENTMCNC: 32.1 GM/DL — SIGNIFICANT CHANGE UP (ref 32–36)
MCV RBC AUTO: 87.7 FL — SIGNIFICANT CHANGE UP (ref 80–100)
MONOCYTES # BLD AUTO: 0.43 K/UL — SIGNIFICANT CHANGE UP (ref 0–0.9)
MONOCYTES NFR BLD AUTO: 5.7 % — SIGNIFICANT CHANGE UP (ref 2–14)
MONOS+MACROS NFR CSF: 9 % — LOW (ref 15–45)
NEUTROPHILS # BLD AUTO: 4.14 K/UL — SIGNIFICANT CHANGE UP (ref 1.8–7.4)
NEUTROPHILS # CSF: 5 % — SIGNIFICANT CHANGE UP (ref 0–6)
NEUTROPHILS NFR BLD AUTO: 54.4 % — SIGNIFICANT CHANGE UP (ref 43–77)
NITRITE UR-MCNC: NEGATIVE — SIGNIFICANT CHANGE UP
NRBC # BLD: 0 /100 WBCS — SIGNIFICANT CHANGE UP (ref 0–0)
NRBC NFR CSF: 1 /UL — SIGNIFICANT CHANGE UP (ref 0–5)
PCO2 BLDV: 43 MMHG — SIGNIFICANT CHANGE UP (ref 35–50)
PH BLDV: 7.39 — SIGNIFICANT CHANGE UP (ref 7.35–7.45)
PH UR: 6 — SIGNIFICANT CHANGE UP (ref 5–8)
PLATELET # BLD AUTO: 332 K/UL — SIGNIFICANT CHANGE UP (ref 150–400)
PO2 BLDV: 26 MMHG — SIGNIFICANT CHANGE UP (ref 25–45)
POTASSIUM BLDV-SCNC: 3.7 MMOL/L — SIGNIFICANT CHANGE UP (ref 3.5–5.3)
POTASSIUM SERPL-MCNC: 4.1 MMOL/L — SIGNIFICANT CHANGE UP (ref 3.5–5.3)
POTASSIUM SERPL-SCNC: 4.1 MMOL/L — SIGNIFICANT CHANGE UP (ref 3.5–5.3)
PROT CSF-MCNC: 19 MG/DL — SIGNIFICANT CHANGE UP (ref 15–45)
PROT SERPL-MCNC: 7.6 G/DL — SIGNIFICANT CHANGE UP (ref 6–8.3)
PROT UR-MCNC: NEGATIVE — SIGNIFICANT CHANGE UP
PROTHROM AB SERPL-ACNC: 11.4 SEC — SIGNIFICANT CHANGE UP (ref 10.6–13.6)
RBC # BLD: 5.04 M/UL — SIGNIFICANT CHANGE UP (ref 3.8–5.2)
RBC # CSF: 36 /UL — HIGH (ref 0–0)
RBC # FLD: 12.7 % — SIGNIFICANT CHANGE UP (ref 10.3–14.5)
RBC CASTS # UR COMP ASSIST: 1 /HPF — SIGNIFICANT CHANGE UP (ref 0–4)
SAO2 % BLDV: 48 % — LOW (ref 67–88)
SARS-COV-2 RNA SPEC QL NAA+PROBE: SIGNIFICANT CHANGE UP
SODIUM SERPL-SCNC: 138 MMOL/L — SIGNIFICANT CHANGE UP (ref 135–145)
SP GR SPEC: 1.03 — HIGH (ref 1.01–1.02)
SPECIMEN SOURCE: SIGNIFICANT CHANGE UP
TROPONIN T, HIGH SENSITIVITY RESULT: <6 NG/L — SIGNIFICANT CHANGE UP (ref 0–51)
TUBE TYPE: SIGNIFICANT CHANGE UP
UROBILINOGEN FLD QL: NEGATIVE — SIGNIFICANT CHANGE UP
WBC # BLD: 7.59 K/UL — SIGNIFICANT CHANGE UP (ref 3.8–10.5)
WBC # FLD AUTO: 7.59 K/UL — SIGNIFICANT CHANGE UP (ref 3.8–10.5)
WBC UR QL: 1 /HPF — SIGNIFICANT CHANGE UP (ref 0–5)

## 2020-11-04 PROCEDURE — 70496 CT ANGIOGRAPHY HEAD: CPT | Mod: 26

## 2020-11-04 PROCEDURE — 93010 ELECTROCARDIOGRAM REPORT: CPT

## 2020-11-04 PROCEDURE — 70498 CT ANGIOGRAPHY NECK: CPT | Mod: 26

## 2020-11-04 PROCEDURE — 99223 1ST HOSP IP/OBS HIGH 75: CPT | Mod: GC

## 2020-11-04 PROCEDURE — 71045 X-RAY EXAM CHEST 1 VIEW: CPT | Mod: 26

## 2020-11-04 PROCEDURE — 62270 DX LMBR SPI PNXR: CPT

## 2020-11-04 PROCEDURE — 99285 EMERGENCY DEPT VISIT HI MDM: CPT | Mod: 25

## 2020-11-04 RX ORDER — VANCOMYCIN HCL 1 G
1000 VIAL (EA) INTRAVENOUS ONCE
Refills: 0 | Status: COMPLETED | OUTPATIENT
Start: 2020-11-04 | End: 2020-11-04

## 2020-11-04 RX ORDER — ACYCLOVIR SODIUM 500 MG
750 VIAL (EA) INTRAVENOUS ONCE
Refills: 0 | Status: COMPLETED | OUTPATIENT
Start: 2020-11-04 | End: 2020-11-04

## 2020-11-04 RX ORDER — CEFTRIAXONE 500 MG/1
2000 INJECTION, POWDER, FOR SOLUTION INTRAMUSCULAR; INTRAVENOUS ONCE
Refills: 0 | Status: COMPLETED | OUTPATIENT
Start: 2020-11-04 | End: 2020-11-04

## 2020-11-04 RX ORDER — METOCLOPRAMIDE HCL 10 MG
10 TABLET ORAL ONCE
Refills: 0 | Status: COMPLETED | OUTPATIENT
Start: 2020-11-04 | End: 2020-11-04

## 2020-11-04 RX ORDER — ACYCLOVIR SODIUM 500 MG
VIAL (EA) INTRAVENOUS
Refills: 0 | Status: DISCONTINUED | OUTPATIENT
Start: 2020-11-04 | End: 2020-11-05

## 2020-11-04 RX ORDER — SODIUM CHLORIDE 9 MG/ML
1000 INJECTION, SOLUTION INTRAVENOUS ONCE
Refills: 0 | Status: COMPLETED | OUTPATIENT
Start: 2020-11-04 | End: 2020-11-04

## 2020-11-04 RX ORDER — KETOROLAC TROMETHAMINE 30 MG/ML
15 SYRINGE (ML) INJECTION ONCE
Refills: 0 | Status: DISCONTINUED | OUTPATIENT
Start: 2020-11-04 | End: 2020-11-04

## 2020-11-04 RX ORDER — ACYCLOVIR SODIUM 500 MG
750 VIAL (EA) INTRAVENOUS EVERY 8 HOURS
Refills: 0 | Status: DISCONTINUED | OUTPATIENT
Start: 2020-11-04 | End: 2020-11-05

## 2020-11-04 RX ORDER — SODIUM CHLORIDE 9 MG/ML
1000 INJECTION INTRAMUSCULAR; INTRAVENOUS; SUBCUTANEOUS
Refills: 0 | Status: DISCONTINUED | OUTPATIENT
Start: 2020-11-04 | End: 2020-11-05

## 2020-11-04 RX ADMIN — CEFTRIAXONE 100 MILLIGRAM(S): 500 INJECTION, POWDER, FOR SOLUTION INTRAMUSCULAR; INTRAVENOUS at 15:05

## 2020-11-04 RX ADMIN — Medication 250 MILLIGRAM(S): at 15:41

## 2020-11-04 RX ADMIN — SODIUM CHLORIDE 1000 MILLILITER(S): 9 INJECTION, SOLUTION INTRAVENOUS at 17:46

## 2020-11-04 RX ADMIN — Medication 10 MILLIGRAM(S): at 13:36

## 2020-11-04 RX ADMIN — Medication 15 MILLIGRAM(S): at 14:35

## 2020-11-04 RX ADMIN — Medication 15 MILLIGRAM(S): at 14:06

## 2020-11-04 RX ADMIN — Medication 265 MILLIGRAM(S): at 21:26

## 2020-11-04 RX ADMIN — Medication 265 MILLIGRAM(S): at 16:33

## 2020-11-04 NOTE — ED ADULT NURSE REASSESSMENT NOTE - NS ED NURSE REASSESS COMMENT FT1
Pt admitted to medicine. As per Miguel WINSTON, administer ceftriaxone and vanco before acyclovir. Neuro states improving. Sensation now intact and no right hand trembling present. Stutter still present.

## 2020-11-04 NOTE — ED ADULT NURSE REASSESSMENT NOTE - NS ED NURSE REASSESS COMMENT FT1
HDue to fever and new c/o neck pain, Miguel WINSTON decided to do lumbar puncture. Miguel WINSTON and Nicole PICKETT at bedside performing LP now. Acyclovir ordered from pharmacy.

## 2020-11-04 NOTE — ED ADULT NURSE REASSESSMENT NOTE - NS ED NURSE REASSESS COMMENT FT1
Pt given MRI screening form to fill out. Pt also assisted to the restroom. Report given to ED Holding ОЛЬГА Moura.

## 2020-11-04 NOTE — ED PROVIDER NOTE - CLINICAL SUMMARY MEDICAL DECISION MAKING FREE TEXT BOX
NIEVES Rivera: 23yo F with PMH enlarged pituitary gland BIBEMS p/w stuttering and R hand trembling onset 45 mins PTA. [place] well this morning until making lunch and noticed the table "moving." +assoc decreased sensation to LUE / LLE, as well as generalized diffuse HA x years. Denies any fever/chills, double vision, CP, SOB, abd pain, n/v, weakness, unsteady gait, h/o seizures. CODE STROKE called upon arrival. Mildly tachycardic on arrival, vitals otherwise stable. Exam as above. Plan for labs, CT/CTA, neuro eval, re-evaluate. NIEVES Rivera: 23yo F with PMH enlarged pituitary gland BIBEMS p/w stuttering and R hand trembling onset 45 mins PTA. [place] well this morning until making lunch and noticed the table "moving." +assoc decreased sensation to LUE / LLE, as well as generalized diffuse HA x years. Denies any fever/chills, double vision, CP, SOB, abd pain, n/v, weakness, unsteady gait, h/o seizures. CODE STROKE called upon arrival. Mildly tachycardic on arrival, vitals otherwise stable. Exam as above. Plan for labs, CT/CTA, neuro eval, re-evaluate.  Attending Samia Rivera: 23 y/o female with h/o migraines, presenting with headaches, right arm twitching and diffiuclty speaking. code stroke was called based on triage information and neurology came to evaluate. pt taken for ct which was negative for acute process. while in the ED pt found to have low grade fever. did report neck discomfort as well. d/w pt and neurology and decision made to LP to evaluate for infectious cause of headache and symptoms. ddx also includes possible inflammatory condition. no known h/o demyelinating disease. d/w neurology pt will need MRI to further evaluate. CSF studies pending

## 2020-11-04 NOTE — H&P ADULT - ASSESSMENT
25 yo F w/ hx of migraine (on Amitriptyline) who presents as a code stroke for sudden onset stutter and R hand tremor.   Hx notable for known pituitary lesion and nonspecific cerebral white matter lesions  seen on MRI Head at [facility] in 08/2020  CTH and CTA wnl in ED;       #New onset stutter and tremor  Ddx 2/2 concussion (but pt denying hitting head) vs. 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   2. Non specific cerebral white matter lesions unchanged. Small vessel ischemic disease is not likely at this age unless there is an underlying vasculitis/vasculopathy. The findings are not suggestive of demyelinating disease"   "Small areas of signal abnormality in subcortical parietal regions"    #HA  -currently with HA which feels different than usual migraine  -c/w home amitryptyline 10mg qd    #Nausea  -EKG on 11/4 w/ qtc <500      #PPX  -IMPROVE score of 0  -Diet: regular   25 yo F w/ hx of migraine (on Amitriptyline), pituitary lesion and nonspecific cerebral white matter lesions  seen on MRI Head at [facility] in 08/2020 ,who presents as a code stroke for sudden onset stutter and R hand tremor. CTH and CTA wnl in ED;  LP unrevealing.      #New onset stutter and tremor  Ddx 2/2 concussion (but pt denying hitting head) vs. 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   2. Non specific cerebral white matter lesions unchanged. Small vessel ischemic disease is not likely at this age unless there is an underlying vasculitis/vasculopathy. The findings are not suggestive of demyelinating disease"   "Small areas of signal abnormality in subcortical parietal regions"    #HA  -currently with HA which feels different than usual migraine  -c/w home amitryptyline 10mg qd    #Nausea  -EKG on 11/4 w/ qtc <500      #PPX  -IMPROVE score of 0  -Diet: regular   25 yo F w/ hx of migraine (on Amitriptyline), pituitary lesion and nonspecific cerebral white matter lesions  seen on MRI Head at [facility] in 08/2020 ,who presents as a code stroke for sudden onset stutter and R hand tremor. CTH and CTA wnl in ED;  LP unrevealing.      #New onset stutter and tremor  Ddx 2/2 concussion (but pt denying hitting head) vs. seizure vs meningitis (one time fever, no leukocytosis) vs medication induced vs mood/psych (though less likely as pt denying emotional sx appears euthymic, organized, pleasant)  -s/p LP unrevealing  -c/w empiric acyclovir fo rmenigitis  -appreciate neuro c/s in ED - feels not to be a seizure  -MRI w/ contrast  -consider MRI C-spine w/wo con, given previous brain findings  -EEG      #LLE weakness w/ numbness   DDx post ictal vs lesion in CNS  vs myasthenia gravis (but less likely as no ptosis)  decremental decrease in strength on exam - has good strength initially but with continued exertion fatigues      # Fall  Less likely cardaic: ECG     #Pituitary lesion  Nonenhancing pituitary lesion confirmed, possibly hemorrhagic.   2. Non specific cerebral white matter lesions unchanged. Small vessel ischemic disease is not likely at this age unless there is an underlying vasculitis/vasculopathy. The findings are not suggestive of demyelinating disease"   "Small areas of signal abnormality in subcortical parietal regions"    #HA  -currently with HA which feels different than usual migraine  -hold  home amitryptyline 10mg qd    #Nausea  -EKG on 11/4 w/ qtc <500      #PPX  -IMPROVE score of 0  -Diet: regular     23 yo F w/ hx of migraine (on Amitriptyline), pituitary lesion and nonspecific cerebral white matter lesions  seen on MRI Head at [facility] in 08/2020 ,who presents as a code stroke for sudden onset stutter and R hand tremor. On adm w/ fever Tmax 100.4 x1, no leukocytosis or electrolyte abnlties. CTH and CTA wnl in ED;  LP unrevealing.        Ddx 2/2 concussion (but pt denying hitting head) vs. seizure (post ictal) vs meningitis (one time fever, no leukocytosis) vs MS vs other structural CNS lesion  vs mood/psych (though less likely as pt denying emotional sx appears euthymic, organized, pleasant)

## 2020-11-04 NOTE — H&P ADULT - NSHPREVIEWOFSYSTEMS_GEN_ALL_CORE
REVIEW OF SYSTEMS:    CONSTITUTIONAL: No  fevers or chills  HEAD: +Headache  EYES/ENT: No visual changes;  No vertigo or throat pain   NECK: No pain or stiffness  RESPIRATORY: No cough, wheezing, hemoptysis; No shortness of breath  CARDIOVASCULAR: +chest tightness  GASTROINTESTINAL: No abdominal or epigastric pain.+ nausea, no vomiting, or hematemesis; No diarrhea or constipation. No melena or hematochezia.  GENITOURINARY: No dysuria, frequency or hematuria  NEUROLOGICAL: + L-sided weakness, L-sided numbness  SKIN: No itching, burning, rashes, or lesions   All other review of systems is negative unless indicated above.

## 2020-11-04 NOTE — PATIENT PROFILE ADULT - NSPROPOAPRESSUREINJURY_GEN_A_NUR
no
Locurto  pt just d/c'd from hospital s/p SBO  now c/o abd pain distension  decreased output from ostomy  pt abd distended resonant tender  clear lungs  no resp distress  card RRR S1S2    Pt to have repeat CT A/P here  surgery notified

## 2020-11-04 NOTE — H&P ADULT - PROBLEM SELECTOR PLAN 5
# Fall/LOC  seizure vs cardiac vs hypotension/orthostasis; hx does not sound mechanical  -r/o sz as above  -tropes wnl  -repeat ECG (ecg in ED of poor quality).

## 2020-11-04 NOTE — CONSULT NOTE ADULT - SUBJECTIVE AND OBJECTIVE BOX
Neurology  Consult Note  11-04-20    Name:  SABA LA; 24y (1995)    Chief Complaint: stutter and R arm tremor     HPI: 25 yo F w/ hx of migraine (on Amitriptyline) who presents as a code stroke for sudden onset stutter and R hand tremor. Pt was at home in regular state of health, felt onset of chest pain. Soon after, she fell from standing in presence of her sister but did not hit her head per report. This was at 11:30. After regaining consciousness, she had a stutter and the R arm was shaking and has been doing so ever since. She was brought in by EMS and a code stroke announced at 12:50. She has had migraines in the past but only experiences vomiting, never any focal weakness or symptoms similar to what she presents with. She has had previous CT scans and per pt report has or had an enlarged pituitary gland seen previously on CT. Pt currently without headache (and no recent migraine today).     MRS: 0   LKW: 11:30 AM   NIHSS: 3      PMHx:   Gastritis  Metatarsal bone fracture    PSuHx:   No significant past surgical history      Medications:  MEDICATIONS  (STANDING):    MEDICATIONS  (PRN):      Vitals:  T(C): 38 (11-04-20 @ 13:22), Max: 38 (11-04-20 @ 13:22)  HR: 98 (11-04-20 @ 13:22) (98 - 107)  BP: 125/84 (11-04-20 @ 13:22) (117/83 - 125/84)  RR: 16 (11-04-20 @ 13:22) (16 - 16)  SpO2: 99% (11-04-20 @ 13:22) (96% - 99%)      Labs:                        14.2   7.59  )-----------( 332      ( 04 Nov 2020 12:59 )             44.2     11-04    138  |  104  |  10  ----------------------------<  106<H>  4.1   |  21<L>  |  0.63    Ca    9.8      04 Nov 2020 12:59    TPro  7.6  /  Alb  4.7  /  TBili  0.2  /  DBili  x   /  AST  18  /  ALT  15  /  AlkPhos  111  11-04    CAPILLARY BLOOD GLUCOSE  POCT Blood Glucose.: 104 mg/dL (04 Nov 2020 12:55)      PT/INR - ( 04 Nov 2020 12:59 )   PT: 11.4 sec;   INR: 0.95 ratio    PTT - ( 04 Nov 2020 12:59 )  PTT:38.2 sec          PHYSICAL EXAMINATION:  General: Well-developed, well nourished, appears worried.  Eyes: Conjunctiva and sclera clear, glassy-eyed appearance.     Neurologic:  - Mental Status:  Alert, awake, oriented to person, "hospital," age, month; Notable dysprosody with pronounced stutter, naming and repetition intact, answers questions appropriately and able to follow simple commands  - Cranial Nerves:  Visual fields are full to confrontation; PERRL (3mm b/l); Extraocular movements are intact without nystagmus; Facial sensation is intact in the V1-V3 distribution bilaterally; Face is symmetric with normal eye closure and smile; Hearing is intact to voice  - Motor: Normal muscle bulk and tone throughout. Raises b/l arms and R leg against gravity for sustained period, L leg with some drift but does not touch bed  - Reflexes: Deferred  - Sensory: Decreased to LT over L UE and LE  - Coordination: R arm w/ non-suppressible, waxing-and-waning, high frequency tremor; finger-nose-finger intact b/l, heel-knee-shin intact for R LE, pt unable to perform with L LE  - Gait: Not tested        Radiology:  < from: CT Head No Cont (08.30.20 @ 16:38) >  FINDINGS:    HEMISPHERES:  No mass or space occupying lesion.  No acute ischemic changes or hemorrhagic foci are suggested.  VENTRICLES:  Midline and normal in size.  POSTERIOR FOSSA:  The brain stem and cerebellum are unremarkable.  No CP angle lesion noted.  EXTRACEREBRAL SPACES:  No subdural or epidural collections are noted.  SKULL BASE AND CALVARIUM:  Appears intact.  No fracture or destructive lesion is identified.  SINUSES AND MASTOIDS:  Clear.  MISCELLANEOUS:  No orbital or suprasellar abnormality noted.    IMPRESSION:  1)  unremarkable CT study of the brain  2)  clear sinuses and mastoids..      MOY HAM M.D., ATTENDING RADIOLOGIST  This document has been electronically signed. Aug 30 2020  4:43PM    < end of copied text >

## 2020-11-04 NOTE — H&P ADULT - PROBLEM SELECTOR PLAN 2
#LLE weakness w/ numbness   as above  decremental decrease in strength on exam - has good strength initially but with continued exertion fatigues.

## 2020-11-04 NOTE — ED PROVIDER NOTE - ATTENDING CONTRIBUTION TO CARE
Attending MD Samia Rivera:   I personally have seen and examined this patient.  Physician assistant note reviewed and agree on plan of care and except where noted.  See HPI, PE, and MDM for details.

## 2020-11-04 NOTE — H&P ADULT - PROBLEM SELECTOR PLAN 3
MRI at [facility] in 08/2020:  1. 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   2. Non specific cerebral white matter lesions unchanged. Small vessel ischemic disease is not likely at this age unless there is an underlying vasculitis/vasculopathy. The findings are not suggestive of demyelinating disease"   "Small areas of signal abnormality in subcortical parietal regions".    As above

## 2020-11-04 NOTE — ED PROVIDER NOTE - PROGRESS NOTE DETAILS
oral temp of 100.4F. cultures added. neuro made aware. - Genoveva Rivera PA-C Attending Samia Rivera: rectal temp of 100.1. no neck stiffness or rigidity. no rash. pt well appearing. tremor to hand improving as headache improving Attending Samia Rivera: rectal temp of 100.1.  no rash. pt well appearing. tremor to hand improving as headache improving Attending Samia Rivera: pt now complaining of worseing neck pain.  with fever d/w neurology who agrees with performing LP to further evaluate. unlikely bacterial meningitis as headache for last few days but with fever will await cultures and treat prophylactically pending culture results

## 2020-11-04 NOTE — H&P ADULT - NSHPSOCIALHISTORY_GEN_ALL_CORE
SH: works in [employer]  lives with family (mom, dad and sibling)  Denies tobacco use or drug use  Report social etoh  Not sexually active

## 2020-11-04 NOTE — ED PROVIDER NOTE - PHYSICAL EXAMINATION
Attending Samia Rivera: Gen: NAD, heent: atrauamtic, eomi, perrla, mmm, op pink, uvula midline, neck; nttp,, chest: nttp, no crepitus, cv: rrr, no murmurs, lungs: ctab, abd: soft, nontender, nondistended, no peritoneal signs, +BS, no guarding, ext: wwp, neg homans, skin: no rash, neuro: awake and alert, following commands, speech clear, sensation and strength intact, no focal deficits, stuttering

## 2020-11-04 NOTE — ED PROVIDER NOTE - OBJECTIVE STATEMENT
23yo F with PMH enlarged pituitary gland presenting BIBEMS presenting with stuttering and R hand trembling onset 45 mins prior to arrival. Pt reports she felt well this morning until making lunch and noticed the table "moving." Also reports decreased sensation to LUE and LLE, as well as generalized diffuse HA x years. Denies any fever/chills, double vision, CP, SOB, abd pain, n/v, weakness, unsteady gait, h/o seizures.   Follows with neuro and neurosurgeon in . 25yo F with PMH enlarged pituitary gland presenting BIBEMS presenting with stuttering and R hand trembling onset 45 mins prior to arrival. Pt reports she felt well this morning until making lunch and noticed the table "moving." Also reports decreased sensation to LUE and LLE, as well as generalized diffuse HA x years. Denies any fever/chills, double vision, CP, SOB, abd pain, n/v, weakness, unsteady gait, h/o seizures.  CODE STROKE called upon arrival.   Follows with neuro and neurosurgeon in . 25yo F with PMH enlarged pituitary gland BIBEMS presenting with stuttering and R hand trembling onset 45 mins prior to arrival. Pt reports she felt well this morning until making lunch and noticed the table "moving." Also reports decreased sensation to LUE and LLE, as well as generalized diffuse HA x years. Denies any fever/chills, double vision, CP, SOB, abd pain, n/v, weakness, unsteady gait, h/o seizures.  CODE STROKE called upon arrival.   Follows with neuro and neurosurgeon in .

## 2020-11-04 NOTE — H&P ADULT - HISTORY OF PRESENT ILLNESS
25 yo F w/ hx of migraine (on Amitriptyline) who presents as a code stroke for sudden onset stutter and R hand tremor. Pt was at home in regular state of health, felt onset of chest pain. Soon after, she fell from standing in presence of her sister but did not hit her head per report. This was at 11:30. After regaining consciousness, she had a stutter and the R arm was shaking and has been doing so ever since. She was brought in by EMS and a code stroke announced at 12:50. She has had migraines in the past but only experiences vomiting, never any focal weakness or symptoms similar to what she presents with. She has had previous CT scans and per pt report has or had an enlarged pituitary gland seen previously on CT. Pt currently without headache (and no recent migraine today).     Attempted to call family member Galen [phone], no answer VM left.    In the ED: Code stroke called, NIHSS 3. CTA and CT Head unrevealing. CXR clear lungs.  VS: Tmax of 100.4, HR 98, /84  LP performed in ED: color clear, total nucleated cell count 1, glucose and protein wnl, elevated RBC likely bloody tap       23 yo F w/ hx of migraine (on Amitriptyline) who presents as a code stroke for sudden onset stutter and R hand tremor. Pt was at home in regular state of health, felt onset of chest pain. She was sitting at the kitchen table then reports LOC, but no trauma/fall just hunched over. This was at 11:30. After regaining consciousness, she had a stutter and the R arm was shaking and has been doing so ever since. She was brought in by EMS and a code stroke announced at 12:50. She has had migraines in the past but only experiences vomiting, never any focal weakness or symptoms similar to what she presents with.     Currently with tremor of R hand - it started off after a few hours, feels "shaky" sometimes for a few seconds. Report L sided weakness of LUE and LLE, and sensation of "heaviness", associated with numbness as well. Also reports constant tension in the L shoulder. Reports chest tightness which is not constant but comes and goes for a few seconds like  "gym pain", denies SOB. Also reporting a mental fog and sense of confusion since this episode.  She currently has HA which is bandlike involving both sides of the head, with a "thunder pain" sensation, no visual changes, strange smells.  It feels different than her usual migraine. Currently with some nausea but no vomiting. Also reporting dizziness, but no tinnitus.    No recent illnesses. Reporting good mood. No recent stressor events. Fhx of "cervicalagia" but otherwise denies any Fhx of seizure.    Pt follows at [facility] with neurology, endocrinology, and was supposed to see neurosurgery for evaluation of non enhancing pituitary lesion. In  08/2020 pt had MRI at [facility], findings shown to writer via [facility] patient portal babar:   "Impression  1. 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   2. Non specific cerebral white matter lesions unchanged. Small vessel ischemic disease is not likely at this age unless there is an underlying vasculitis/vasculopathy. The findings are not suggestive of demyelinating disease"   "Small areas of signal abnormality in subcortical parietal regions"    Attempted to call family member Galen [phone], no answer VM left.    In the ED: Code stroke called, NIHSS 3. CTA and CT Head unrevealing. CXR clear lungs.  VS: Tmax of 100.4, HR 98, /84  LP performed in ED: color clear, total nucleated cell count 1, glucose and protein wnl, elevated RBC likely bloody tap       25 yo F w/ hx of migraine (on Amitriptyline) who presents as a code stroke for sudden onset stutter and R hand tremor. Pt was at home in regular state of health, felt onset of chest pain. She was sitting at the kitchen table then reports LOC, but no trauma/fall just hunched over. This was at 11:30. After regaining consciousness, she had a stutter and the R arm was shaking and has been doing so ever since. Collateral from aunt largely confirming this - she states pt was feeling generally weak the night before, woke up the next morning felt like she was in her usual state of health. She was making lunch when all of sudden she started shaking then fell to the ground. Sister moved pt to bed and EMS was called. Notably one difference is aunt saying pt never LOC, but pt endorsing LOC.  She was brought in by EMS and a code stroke announced at 12:50. She has had migraines in the past but only experiences vomiting, never any focal weakness or symptoms similar to what she presents with.     Currently with tremor of R hand - it started off after a few hours, feels "shaky" sometimes for a few seconds. Report L sided weakness of LUE and LLE, and sensation of "heaviness", associated with numbness as well. Also reports constant tension in the L shoulder. Reports chest tightness which is not constant but comes and goes for a few seconds like  "gym pain", denies SOB. Also reporting a mental fog and sense of confusion since this episode.  She currently has HA which is bandlike involving both sides of the head, with a "thunder pain" sensation, no visual changes, strange smells.  It feels different than her usual migraine. Currently with some nausea but no vomiting. Also reporting dizziness, but no tinnitus.    No recent illnesses. Reporting good mood. No recent stressor events. Fhx of "cervicalagia" but otherwise denies any Fhx of seizure.    Pt follows at [facility] with neurology, endocrinology, and was supposed to see neurosurgery for evaluation of non enhancing pituitary lesion. In  08/2020 pt had MRI at [facility], findings shown to writer via [facility] patient portal babar:   "Impression  1. 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   2. Non specific cerebral white matter lesions unchanged. Small vessel ischemic disease is not likely at this age unless there is an underlying vasculitis/vasculopathy. The findings are not suggestive of demyelinating disease"   "Small areas of signal abnormality in subcortical parietal regions"    Attempted to call family member Galen [phone], no answer VM left.    In the ED: Code stroke called, NIHSS 3. CTA and CT Head unrevealing. CXR clear lungs.  VS: Tmax of 100.4, HR 98, /84  LP performed in ED: color clear, total nucleated cell count 1, glucose and protein wnl, elevated RBC likely bloody tap       23 yo F w/ hx of migraine (on Amitriptyline) who presents as a code stroke for sudden onset stutter and R hand tremor. Pt was at home in regular state of health, felt onset of chest pain. She was sitting at the kitchen table then reports LOC, but no trauma/fall just hunched over. This was at 11:30. After regaining consciousness, she had a stutter and the R arm was shaking and has been doing so ever since. Collateral from aunt largely confirming this - she states pt was feeling generally weak the night before, woke up the next morning felt like she was in her usual state of health. She was making lunch when all of sudden she started shaking then fell to the ground. Sister moved pt to bed and EMS was called. Notably one difference is aunt saying pt never LOC, but pt endorsing LOC.  She was brought in by EMS and a code stroke announced at 12:50. She has had migraines in the past but only experiences vomiting, never any focal weakness or symptoms similar to what she presents with.     Currently with tremor of R hand - it started off after a few hours, feels "shaky" sometimes for a few seconds. Report L sided weakness of LUE and LLE, and sensation of "heaviness", associated with numbness as well. Also reports constant tension in the L shoulder. Reports chest tightness which is not constant but comes and goes for a few seconds like  "gym pain", denies SOB. Also reporting a mental fog and sense of confusion since this episode.  She currently has HA which is bandlike involving both sides of the head, with a "thunder pain" sensation, no visual changes, strange smells.  It feels different than her usual migraine. Currently with some nausea but no vomiting. Also reporting dizziness, but no tinnitus.    No recent illnesses. Reporting good mood. No recent stressor events. Fhx of "cervicalagia" but otherwise denies any Fhx of seizure.    Pt follows at [facility] with neurology, endocrinology, and was supposed to see neurosurgery for evaluation of non enhancing pituitary lesion. In  08/2020 pt had MRI at [facility], findings shown to writer via [facility] patient portal babar:   "Impression  1. 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   2. Non specific cerebral white matter lesions unchanged. Small vessel ischemic disease is not likely at this age unless there is an underlying vasculitis/vasculopathy. The findings are not suggestive of demyelinating disease"   "Small areas of signal abnormality in subcortical parietal regions"        In the ED: Code stroke called, NIHSS 3. CTA and CT Head unrevealing. CXR clear lungs.  VS: Tmax of 100.4, HR 98, /84  LP performed in ED: color clear, total nucleated cell count 1, glucose and protein wnl, elevated RBC likely bloody tap

## 2020-11-04 NOTE — H&P ADULT - PROBLEM SELECTOR PLAN 1
#New onset stutter and tremor  Ddx 2/2 concussion (but pt denying hitting head) vs. seizure (post ictal) vs meningitis (one time fever, no leukocytosis) vs MS vs other structural CNS lesion  vs mood/psych (though less likely as pt denying emotional sx appears euthymic, organized, pleasant)  -s/p LP unrevealing  -c/w empiric acyclovir for meningitis  -IVF while giving acyclovir  -appreciate neuro c/s in ED - feels not to be a seizure  -MRI Brain w/ contrast  - MRI C-spine, t-spine, c-spine, w/wo con, given sx/presentation and previous brain findings  -EEG.

## 2020-11-04 NOTE — H&P ADULT - ATTENDING COMMENTS
23 yo F w/ hx of migraine (on Amitriptyline), pituitary lesion and nonspecific cerebral white matter lesions  seen on MRI Head at [facility] in 08/2020 ,who presents as a code stroke for sudden onset stutter and R hand tremor after a fall at home.  Patient denies any premonitory symptoms no fever , no chills, no sick contact , no recent travel, patient works at home with no known sick contact and no pets. Patient denies any urinary or bowel incontinence, no Illicit drug use, no visual or auditory hallucinations, no vision disturbance or neck pain.  In the ED patient was evaluated by the Neuro team and CT of head with no acute .  LP was done with WBC Of 1 and normal protein in CSF .  Pt was started on IV Acyclovir for the possibility of HSV , which will cont and f/up the PCR.  CSF Gram stain is negative .  At this point a MRI of brain and spine is warranted and will get EEG.  ? Demyelinating disease of brain .  F/UP MRI and might get neurosurgery involved based on MRI report.      Adrianne Desai   Hospitalist   [phone]

## 2020-11-04 NOTE — ED ADULT NURSE REASSESSMENT NOTE - NS ED NURSE REASSESS COMMENT FT1
Pt reports improvement in headache from 8/10 to 5/10 after reglan, right hand trembling also improving. Miguel WINSTON notified. Additional bloodwork including blood cultures sent to lab. Will continue to monitor.

## 2020-11-04 NOTE — H&P ADULT - NSHPOUTPATIENTPROVIDERS_GEN_ALL_CORE
[facility]  Dr. Alex Roman - endo  Dr. Renaldo Paulino -Neurosx  Dr. Kaylah Mahmood -Neurology

## 2020-11-04 NOTE — CONSULT NOTE ADULT - ASSESSMENT
23 yo F w/ hx of migraines on Amitriptyline who presents as code stroke with new onset of marked stutter and R arm tremor starting after reported episode of LOC approximately 1 hour prior to presentation. On exam, the patient's language is intact with the exception of the stutter and the tremor waxes and wanes but is constantly present. Also some L LE drift and loss of sensation over L arm and leg. CT negative and per neuroradiologist, CTA also with no vascular compromise. Constellation of symptoms not consistent with stroke as they are not localizable, confirmed by stroke fellow on call. Also unlikely to be seizure as motion waxes and wanes and is not stereotyped.       Recommendations:   - MRI w/wo contrast  - CDU for monitoring of symptoms  - metabolic work-up as per primary team   - consider behavioral etiologies to presentation

## 2020-11-04 NOTE — H&P ADULT - NSHPLABSRESULTS_GEN_ALL_CORE
14.2   7.59  )-----------( 332      ( 2020 12:59 )             44.2       1104    138  |  104  |  10  ----------------------------<  106<H>  4.1   |  21<L>  |  0.63    Ca    9.8      2020 12:59    TPro  7.6  /  Alb  4.7  /  TBili  0.2  /  DBili  x   /  AST  18  /  ALT  15  /  AlkPhos  111  11-04              Urinalysis Basic - ( 2020 14:06 )    Color: Colorless / Appearance: Clear / S.033 / pH: x  Gluc: x / Ketone: Negative  / Bili: Negative / Urobili: Negative   Blood: x / Protein: Negative / Nitrite: Negative   Leuk Esterase: Negative / RBC: 1 /hpf / WBC 1 /HPF   Sq Epi: x / Non Sq Epi: 2 /hpf / Bacteria: Negative        PT/INR - ( 2020 12:59 )   PT: 11.4 sec;   INR: 0.95 ratio         PTT - ( 2020 12:59 )  PTT:38.2 sec    Lactate Trend            CAPILLARY BLOOD GLUCOSE      POCT Blood Glucose.: 104 mg/dL (2020 12:55)

## 2020-11-04 NOTE — ED ADULT NURSE NOTE - OBJECTIVE STATEMENT
24y female brought in by EMS from home c/o stutter. A&Ox3. Hx of migraines and pituitary adenoma. As per Neuro MD, pt report chest pain and lightheadedness earlier today with "loss of consciousness." Upon waking up, pt reports significant stutter, right hand trembling and left foot numbness. 24y female brought in by EMS from home c/o stutter. A&Ox3. Hx of migraines and pituitary adenoma. As per Neuro MD, pt report chest pain and lightheadedness earlier today with "loss of consciousness." Upon waking up, pt reports significant stutter, right hand trembling and left foot numbness. Upon arrival to ED at 1250, Code Stroke called and pt immediately to CT. Pt A&Ox3. Significant stutter noted. PERRLA 3mm, face symmetrical, LLE weak with decreased sensation, all other extremities strong with sensation intact, right arm trembling. Respirations even and unlabored.

## 2020-11-04 NOTE — H&P ADULT - NSHPPHYSICALEXAM_GEN_ALL_CORE
PHYSICAL EXAM:  GENERAL: NAD, well-groomed, well-developed  HEAD:  Atraumatic, Normocephalic  EYES: EOMI, PERRLA, conjunctiva and sclera clear  ENMT: No tonsillar erythema, exudates, or enlargement; Moist mucous membranes, Good dentition, No lesions  NECK: Supple, No JVD, Normal thyroid  CHEST/LUNG: Clear to percussion bilaterally; No rales, rhonchi, wheezing, or rubs  HEART: Regular rate and rhythm; No murmurs, rubs, or gallops  ABDOMEN: Soft, Nontender, Nondistended; Bowel sounds present  EXTREMITIES:  2+ Peripheral Pulses, No clubbing, cyanosis, or edema  LYMPH: No lymphadenopathy noted  SKIN: No rashes or lesions  NERVOUS SYSTEM:  Alert & Oriented X3, Good concentration; Motor Strength 5/5 B/L upper and lower extremities; DTRs 2+ intact and symmetric PHYSICAL EXAM:  GENERAL: NAD, well-groomed, well-developed, obese  HEAD:  Atraumatic, Normocephalic  EYES:  conjunctiva and sclera clear  ENMT: No tonsillar erythema, exudates, or enlargement; Moist mucous membranes, Good dentition, No lesions, uvula pointing to left  NECK: Supple, No JVD, Normal thyroid  CHEST/LUNG: CTAB, No rales, rhonchi, wheezing, or rubs  HEART: Regular rate and rhythm; No murmurs, rubs, or gallops  ABDOMEN: Soft, Nontender, Nondistended; Bowel sounds present  EXTREMITIES:  2+ Peripheral Pulses, No clubbing, cyanosis, or edema  LYMPH: No lymphadenopathy noted  SKIN: No rashes or lesions  NERVOUS SYSTEM:  Alert & Oriented X3, Good concentration; + stutter +R arm/hand tremor intermittent; able to write without tremor   visual fields full, EOMI w/ some ?nystagmus on leftward gaze, pupils reactive to light but dilate again while light shining, decreased CNV on L, symmetric smile and eyebrow raise, asymmetric uvula (but appears Leftward pointing without palate elevation), decreased CN VIII on left, tongue symmetric  Strength: 5/5 strength on UE initially but with repeated movement becomes weak (decremental decrease in response). RLE 5/5, LLE 4/5  Sensory: decreased sensation of LUE, and LLE  Cerebellar: no dysmetria on FNF, no dysdiadokinesia  PSYCH: aaox3, appropriate, pleasant, reporting normal mood normally though currently feeling frustrated d/t stutter; thought process linear, with good content

## 2020-11-05 ENCOUNTER — TRANSCRIPTION ENCOUNTER (OUTPATIENT)
Age: 25
End: 2020-11-05

## 2020-11-05 LAB
ALBUMIN SERPL ELPH-MCNC: 3.7 G/DL — SIGNIFICANT CHANGE UP (ref 3.3–5)
ALP SERPL-CCNC: 83 U/L — SIGNIFICANT CHANGE UP (ref 40–120)
ALT FLD-CCNC: 14 U/L — SIGNIFICANT CHANGE UP (ref 10–45)
ANION GAP SERPL CALC-SCNC: 8 MMOL/L — SIGNIFICANT CHANGE UP (ref 5–17)
AST SERPL-CCNC: 17 U/L — SIGNIFICANT CHANGE UP (ref 10–40)
BASOPHILS # BLD AUTO: 0.01 K/UL — SIGNIFICANT CHANGE UP (ref 0–0.2)
BASOPHILS NFR BLD AUTO: 0.2 % — SIGNIFICANT CHANGE UP (ref 0–2)
BILIRUB SERPL-MCNC: 0.3 MG/DL — SIGNIFICANT CHANGE UP (ref 0.2–1.2)
BUN SERPL-MCNC: 8 MG/DL — SIGNIFICANT CHANGE UP (ref 7–23)
CALCIUM SERPL-MCNC: 9.1 MG/DL — SIGNIFICANT CHANGE UP (ref 8.4–10.5)
CHLORIDE SERPL-SCNC: 106 MMOL/L — SIGNIFICANT CHANGE UP (ref 96–108)
CO2 SERPL-SCNC: 23 MMOL/L — SIGNIFICANT CHANGE UP (ref 22–31)
CREAT SERPL-MCNC: 0.55 MG/DL — SIGNIFICANT CHANGE UP (ref 0.5–1.3)
CULTURE RESULTS: SIGNIFICANT CHANGE UP
EOSINOPHIL # BLD AUTO: 0.13 K/UL — SIGNIFICANT CHANGE UP (ref 0–0.5)
EOSINOPHIL NFR BLD AUTO: 2.4 % — SIGNIFICANT CHANGE UP (ref 0–6)
GLUCOSE SERPL-MCNC: 96 MG/DL — SIGNIFICANT CHANGE UP (ref 70–99)
HCT VFR BLD CALC: 37.8 % — SIGNIFICANT CHANGE UP (ref 34.5–45)
HGB BLD-MCNC: 12.1 G/DL — SIGNIFICANT CHANGE UP (ref 11.5–15.5)
IMM GRANULOCYTES NFR BLD AUTO: 0.6 % — SIGNIFICANT CHANGE UP (ref 0–1.5)
LABORATORY COMMENT REPORT: SIGNIFICANT CHANGE UP
LYMPHOCYTES # BLD AUTO: 2.25 K/UL — SIGNIFICANT CHANGE UP (ref 1–3.3)
LYMPHOCYTES # BLD AUTO: 41.8 % — SIGNIFICANT CHANGE UP (ref 13–44)
MAGNESIUM SERPL-MCNC: 1.8 MG/DL — SIGNIFICANT CHANGE UP (ref 1.6–2.6)
MCHC RBC-ENTMCNC: 28.1 PG — SIGNIFICANT CHANGE UP (ref 27–34)
MCHC RBC-ENTMCNC: 32 GM/DL — SIGNIFICANT CHANGE UP (ref 32–36)
MCV RBC AUTO: 87.9 FL — SIGNIFICANT CHANGE UP (ref 80–100)
MONOCYTES # BLD AUTO: 0.32 K/UL — SIGNIFICANT CHANGE UP (ref 0–0.9)
MONOCYTES NFR BLD AUTO: 5.9 % — SIGNIFICANT CHANGE UP (ref 2–14)
NEUTROPHILS # BLD AUTO: 2.64 K/UL — SIGNIFICANT CHANGE UP (ref 1.8–7.4)
NEUTROPHILS NFR BLD AUTO: 49.1 % — SIGNIFICANT CHANGE UP (ref 43–77)
NRBC # BLD: 0 /100 WBCS — SIGNIFICANT CHANGE UP (ref 0–0)
PHOSPHATE SERPL-MCNC: 3.3 MG/DL — SIGNIFICANT CHANGE UP (ref 2.5–4.5)
PLATELET # BLD AUTO: 276 K/UL — SIGNIFICANT CHANGE UP (ref 150–400)
POTASSIUM SERPL-MCNC: 3.4 MMOL/L — LOW (ref 3.5–5.3)
POTASSIUM SERPL-SCNC: 3.4 MMOL/L — LOW (ref 3.5–5.3)
PROT SERPL-MCNC: 6.2 G/DL — SIGNIFICANT CHANGE UP (ref 6–8.3)
RBC # BLD: 4.3 M/UL — SIGNIFICANT CHANGE UP (ref 3.8–5.2)
RBC # FLD: 13.1 % — SIGNIFICANT CHANGE UP (ref 10.3–14.5)
SARS-COV-2 IGG SERPL QL IA: NEGATIVE — SIGNIFICANT CHANGE UP
SARS-COV-2 IGM SERPL IA-ACNC: 0.1 INDEX — SIGNIFICANT CHANGE UP
SODIUM SERPL-SCNC: 137 MMOL/L — SIGNIFICANT CHANGE UP (ref 135–145)
SOURCE HSV 1/2: SIGNIFICANT CHANGE UP
SPECIMEN SOURCE: SIGNIFICANT CHANGE UP
T PALLIDUM AB TITR SER: NEGATIVE — SIGNIFICANT CHANGE UP
WBC # BLD: 5.38 K/UL — SIGNIFICANT CHANGE UP (ref 3.8–10.5)
WBC # FLD AUTO: 5.38 K/UL — SIGNIFICANT CHANGE UP (ref 3.8–10.5)

## 2020-11-05 PROCEDURE — 99233 SBSQ HOSP IP/OBS HIGH 50: CPT | Mod: GC

## 2020-11-05 PROCEDURE — 70553 MRI BRAIN STEM W/O & W/DYE: CPT | Mod: 26

## 2020-11-05 PROCEDURE — 99222 1ST HOSP IP/OBS MODERATE 55: CPT

## 2020-11-05 RX ORDER — ACYCLOVIR SODIUM 500 MG
1000 VIAL (EA) INTRAVENOUS EVERY 8 HOURS
Refills: 0 | Status: DISCONTINUED | OUTPATIENT
Start: 2020-11-05 | End: 2020-11-06

## 2020-11-05 RX ORDER — POTASSIUM CHLORIDE 20 MEQ
40 PACKET (EA) ORAL ONCE
Refills: 0 | Status: COMPLETED | OUTPATIENT
Start: 2020-11-05 | End: 2020-11-05

## 2020-11-05 RX ORDER — SODIUM CHLORIDE 9 MG/ML
1000 INJECTION INTRAMUSCULAR; INTRAVENOUS; SUBCUTANEOUS
Refills: 0 | Status: DISCONTINUED | OUTPATIENT
Start: 2020-11-05 | End: 2020-11-06

## 2020-11-05 RX ORDER — AMITRIPTYLINE HCL 25 MG
10 TABLET ORAL DAILY
Refills: 0 | Status: DISCONTINUED | OUTPATIENT
Start: 2020-11-05 | End: 2020-11-06

## 2020-11-05 RX ADMIN — SODIUM CHLORIDE 75 MILLILITER(S): 9 INJECTION INTRAMUSCULAR; INTRAVENOUS; SUBCUTANEOUS at 18:36

## 2020-11-05 RX ADMIN — Medication 265 MILLIGRAM(S): at 05:22

## 2020-11-05 RX ADMIN — Medication 40 MILLIEQUIVALENT(S): at 11:21

## 2020-11-05 RX ADMIN — Medication 10 MILLIGRAM(S): at 11:20

## 2020-11-05 RX ADMIN — SODIUM CHLORIDE 70 MILLILITER(S): 9 INJECTION INTRAMUSCULAR; INTRAVENOUS; SUBCUTANEOUS at 05:22

## 2020-11-05 RX ADMIN — SODIUM CHLORIDE 70 MILLILITER(S): 9 INJECTION INTRAMUSCULAR; INTRAVENOUS; SUBCUTANEOUS at 17:37

## 2020-11-05 RX ADMIN — Medication 170 MILLIGRAM(S): at 18:31

## 2020-11-05 NOTE — PROGRESS NOTE ADULT - PROBLEM SELECTOR PLAN 1
#New onset stutter and tremor  MRI brain 11/5 Nonspecific focus of T2/FLAIR signal hyperintensity within the left parietal lobe on the images referenced above. This may represent a focus of infection inflammation demyelination or ischemia.  Ddx 2/2 concussion (but pt denying hitting head) vs. seizure (post ictal) vs meningitis (one time fever, no leukocytosis) vs MS vs other structural CNS lesion  vs mood/psych (though less likely as pt denying emotional sx appears euthymic, organized, pleasant)  -s/p LP unrevealing  -HSV negative on LP, dc empiric acyclovir for meningitis  -Syphilis negative  -IVF while giving acyclovir  -appreciate neuro c/s in ED - feels not to be a seizure  -MRI Brain w/ contrast: showing abnormality poss demyelination  - MRI C-spine, t-spine, c-spine, w/wo con, given sx/presentation and previous brain findings  -EEG.  -pt to work on obtaining CD from OSH for comparison #New onset stutter and tremor  MRI brain 11/5 Nonspecific focus of T2/FLAIR signal hyperintensity within the left parietal lobe on the images referenced above. This may represent a focus of infection inflammation demyelination or ischemia.  Ddx 2/2 concussion (but pt denying hitting head) vs. seizure (post ictal) vs meningitis (one time fever, no leukocytosis) vs MS vs other structural CNS lesion  vs mood/psych (though less likely as pt denying emotional sx appears euthymic, organized, pleasant)  -s/p LP unrevealing  -HSV negative on LP, dc empiric acyclovir for meningitis  -Syphilis negative  -IVF while giving acyclovir  -appreciate neuro c/s in ED - feels not to be a seizure  -MRI Brain w/ contrast: showing abnormality poss demyelination  - MRI C-spine, t-spine, c-spine, w/wo con, given sx/presentation and previous brain findings  -EEG.  -pt to work on obtaining CD from OSH for comparison  -speech eval for stutter #New onset stutter and tremor  MRI brain 11/5 Nonspecific focus of T2/FLAIR signal hyperintensity within the left parietal lobe on the images referenced above. This may represent a focus of infection inflammation demyelination or ischemia.  Ddx 2/2 concussion (but pt denying hitting head) vs. seizure (post ictal) vs meningitis (one time fever, no leukocytosis) vs MS vs other structural CNS lesion  vs mood/psych (though less likely as pt denying emotional sx appears euthymic, organized, pleasant)  -s/p LP unrevealing  -HSV negative on LP  -Syphilis negative  -IVF while giving acyclovir  -appreciate neuro c/s in ED - feels not to be a seizure  -MRI Brain w/ contrast: showing abnormality poss demyelination  - MRI C-spine, t-spine, c-spine, w/wo con, given sx/presentation and previous brain findings  -EEG.  -pt to work on obtaining CD from OSH for comparison  -speech eval for stutter

## 2020-11-05 NOTE — PROGRESS NOTE ADULT - ASSESSMENT
23 yo F w/ hx of migraine (on Amitriptyline), pituitary lesion and nonspecific cerebral white matter lesions  seen on MRI Head at [facility] in 08/2020 ,who presents as a code stroke for sudden onset stutter and R hand tremor. On adm w/ fever Tmax 100.4 x1, no leukocytosis or electrolyte abnlties. CTH and CTA wnl in ED;  LP unrevealing. MRI brain showing nonspecific focus of T2/flair signal hyperintensity within the L parietal  lobe  which could be infection vs demylination vs ischemia.        Ddx 2/2 multiple sclerosis/ other dymyelinating dz vs. concussion (but pt denying hitting head) vs. seizure (post ictal)  vs other structural CNS lesion  vs mood/psych (though less likely as pt denying emotional sx appears euthymic, organized, pleasant) vs meningiits

## 2020-11-05 NOTE — PROGRESS NOTE ADULT - PROBLEM SELECTOR PLAN 3
MRI at [facility] in 08/2020:  1. 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   2. Non specific cerebral white matter lesions unchanged. Small vessel ischemic disease is not likely at this age unless there is an underlying vasculitis/vasculopathy. The findings are not suggestive of demyelinating disease"   "Small areas of signal abnormality in subcortical parietal regions".    As above

## 2020-11-05 NOTE — DISCHARGE NOTE NURSING/CASE MANAGEMENT/SOCIAL WORK - PATIENT PORTAL LINK FT
You can access the FollowMyHealth Patient Portal offered by [facility] by registering at the following website: [email or web address]. By joining [employer]’s FollowMyHealth portal, you will also be able to view your health information using other applications (apps) compatible with our system.

## 2020-11-05 NOTE — PROGRESS NOTE ADULT - SUBJECTIVE AND OBJECTIVE BOX
PROGRESS NOTE:     CONTACT INFO:  Laura Quiroga MD | PGY-1  Pager: [phone] [place], [phone] [facility]  After 7pm page night float      Patient is a 24y old  Female who presents with a chief complaint of new onset stutter and R hand tremor (2020 18:05)      SUBJECTIVE / OVERNIGHT EVENTS:    - No acute events overnight.   - No fevers/chills.  - Patient seen and evaluated at bedside.  - Denies SOB at rest, chest pain, palpitations, abdominal pain, nausea/vomiting    ADDITIONAL REVIEW OF SYSTEMS:    MEDICATIONS  (STANDING):  amitriptyline 10 milliGRAM(s) Oral daily  sodium chloride 0.9%. 1000 milliLiter(s) (70 mL/Hr) IV Continuous <Continuous>    MEDICATIONS  (PRN):      CAPILLARY BLOOD GLUCOSE        I&O's Summary    2020 07:01  -  2020 07:00  --------------------------------------------------------  IN: 810 mL / OUT: 0 mL / NET: 810 mL    2020 07:01  -  2020 13:39  --------------------------------------------------------  IN: 240 mL / OUT: 0 mL / NET: 240 mL        PHYSICAL EXAM:  Vital Signs Last 24 Hrs  T(C): 36.7 (2020 12:04), Max: 37.8 (2020 13:50)  T(F): 98.1 (2020 12:04), Max: 100.1 (2020 13:50)  HR: 83 (2020 12:04) (83 - 97)  BP: 117/80 (2020 12:04) (102/74 - 128/76)  BP(mean): --  RR: 17 (2020 12:04) (16 - 18)  SpO2: 98% (2020 12:04) (98% - 100%)    Physical Exam: PHYSICAL EXAM:  GENERAL: NAD, well-groomed, well-developed, obese  HEAD:  Atraumatic, Normocephalic  EYES:  conjunctiva and sclera clear  ENMT: No tonsillar erythema, exudates, or enlargement; Moist mucous membranes, Good dentition, No lesions, uvula pointing to left  NECK: Supple, No JVD, Normal thyroid  CHEST/LUNG: CTAB, No rales, rhonchi, wheezing, or rubs  HEART: Regular rate and rhythm; No murmurs, rubs, or gallops  ABDOMEN: Soft, Nontender, Nondistended; Bowel sounds present  EXTREMITIES:  2+ Peripheral Pulses, No clubbing, cyanosis, or edema  LYMPH: No lymphadenopathy noted  SKIN: No rashes or lesions  NERVOUS SYSTEM:  Alert & Oriented X3, Good concentration; + stutter +R arm/hand tremor intermittent; able to write without tremor   visual fields full, EOMI w/ some ?nystagmus on leftward gaze, pupils reactive to light but dilate again while light shining, decreased CNV on L, symmetric smile and eyebrow raise, asymmetric uvula (but appears Leftward pointing without palate elevation), decreased CN VIII on left, tongue symmetric  Strength: 5/5 strength on UE initially but with repeated movement becomes weak (decremental decrease in response). RLE 5/5, LLE 4/5  Sensory: decreased sensation of LUE, and LLE  Cerebellar: no dysmetria on FNF, no dysdiadokinesia  PSYCH: aaox3, appropriate, pleasant, reporting normal mood normally though currently feeling frustrated d/t stutter; thought process linear, with good content      LABS:                        12.1   5.38  )-----------( 276      ( 2020 07:29 )             37.8     -    137  |  106  |  8   ----------------------------<  96  3.4<L>   |  23  |  0.55    Ca    9.1      2020 07:28  Phos  3.3     -  Mg     1.8         TPro  6.2  /  Alb  3.7  /  TBili  0.3  /  DBili  x   /  AST  17  /  ALT  14  /  AlkPhos  83      PT/INR - ( 2020 12:59 )   PT: 11.4 sec;   INR: 0.95 ratio         PTT - ( 2020 12:59 )  PTT:38.2 sec      Urinalysis Basic - ( 2020 14:06 )    Color: Colorless / Appearance: Clear / S.033 / pH: x  Gluc: x / Ketone: Negative  / Bili: Negative / Urobili: Negative   Blood: x / Protein: Negative / Nitrite: Negative   Leuk Esterase: Negative / RBC: 1 /hpf / WBC 1 /HPF   Sq Epi: x / Non Sq Epi: 2 /hpf / Bacteria: Negative        Culture - CSF with Gram Stain (collected 2020 17:41)  Source: .CSF CSF  Gram Stain (2020 18:38):    No polymorphonuclear cells seen    No organisms seen    by cytocentrifuge        RADIOLOGY & ADDITIONAL TESTS:  Results Reviewed:   Imaging Personally Reviewed:  Electrocardiogram Personally Reviewed:    COORDINATION OF CARE:  Care Discussed with Consultants/Other Providers [Y/N]: Y  Prior or Outpatient Records Reviewed [Y/N]: Y   PROGRESS NOTE:     CONTACT INFO:  Laura Quiroga MD | PGY-1  Pager: [phone] [place], [phone] [facility]  After 7pm page night float      Patient is a 24y old  Female who presents with a chief complaint of new onset stutter and R hand tremor (2020 18:05)      SUBJECTIVE / OVERNIGHT EVENTS:    - No acute events overnight.   - No fevers/chills.  - Patient seen and evaluated at bedside.  - Denies SOB at rest, chest pain, palpitations, abdominal pain, nausea/vomiting  - Still symptomatic.    ADDITIONAL REVIEW OF SYSTEMS:    MEDICATIONS  (STANDING):  amitriptyline 10 milliGRAM(s) Oral daily  sodium chloride 0.9%. 1000 milliLiter(s) (70 mL/Hr) IV Continuous <Continuous>    MEDICATIONS  (PRN):      CAPILLARY BLOOD GLUCOSE        I&O's Summary    2020 07:01  -  2020 07:00  --------------------------------------------------------  IN: 810 mL / OUT: 0 mL / NET: 810 mL    2020 07:01  -  2020 13:39  --------------------------------------------------------  IN: 240 mL / OUT: 0 mL / NET: 240 mL        PHYSICAL EXAM:  Vital Signs Last 24 Hrs  T(C): 36.7 (2020 12:04), Max: 37.8 (2020 13:50)  T(F): 98.1 (2020 12:04), Max: 100.1 (2020 13:50)  HR: 83 (2020 12:04) (83 - 97)  BP: 117/80 (2020 12:04) (102/74 - 128/76)  BP(mean): --  RR: 17 (2020 12:04) (16 - 18)  SpO2: 98% (2020 12:04) (98% - 100%)    Physical Exam: PHYSICAL EXAM:  GENERAL: NAD, well-groomed, well-developed, obese  HEAD:  Atraumatic, Normocephalic  EYES:  conjunctiva and sclera clear  ENMT: No tonsillar erythema, exudates, or enlargement; Moist mucous membranes, Good dentition, No lesions, uvula pointing to left  NECK: Supple, No JVD, Normal thyroid  CHEST/LUNG: CTAB, No rales, rhonchi, wheezing, or rubs  HEART: Regular rate and rhythm; No murmurs, rubs, or gallops  ABDOMEN: Soft, Nontender, Nondistended; Bowel sounds present  EXTREMITIES:  2+ Peripheral Pulses, No clubbing, cyanosis, or edema  LYMPH: No lymphadenopathy noted  SKIN: No rashes or lesions  NERVOUS SYSTEM:  Alert & Oriented X3, Good concentration; + stutter +R arm/hand tremor intermittent; able to write without tremor   visual fields full, EOMI w/ some ?nystagmus on leftward gaze, pupils reactive to light but dilate again while light shining, decreased CNV on L, symmetric smile and eyebrow raise, asymmetric uvula (but appears Leftward pointing without palate elevation), decreased CN VIII on left, tongue symmetric  Strength: 5/5 strength on UE initially but with repeated movement becomes weak (decremental decrease in response). RLE 5/5, LLE 4/5  Sensory: decreased sensation of LUE, and LLE  Cerebellar: no dysmetria on FNF, no dysdiadokinesia  PSYCH: aaox3, appropriate, pleasant, reporting normal mood normally though currently feeling frustrated d/t stutter; thought process linear, with good content      LABS:                        12.1   5.38  )-----------( 276      ( 2020 07:29 )             37.8         137  |  106  |  8   ----------------------------<  96  3.4<L>   |  23  |  0.55    Ca    9.1      2020 07:28  Phos  3.3       Mg     1.8         TPro  6.2  /  Alb  3.7  /  TBili  0.3  /  DBili  x   /  AST  17  /  ALT  14  /  AlkPhos  83      PT/INR - ( 2020 12:59 )   PT: 11.4 sec;   INR: 0.95 ratio         PTT - ( 2020 12:59 )  PTT:38.2 sec      Urinalysis Basic - ( 2020 14:06 )    Color: Colorless / Appearance: Clear / S.033 / pH: x  Gluc: x / Ketone: Negative  / Bili: Negative / Urobili: Negative   Blood: x / Protein: Negative / Nitrite: Negative   Leuk Esterase: Negative / RBC: 1 /hpf / WBC 1 /HPF   Sq Epi: x / Non Sq Epi: 2 /hpf / Bacteria: Negative        Culture - CSF with Gram Stain (collected 2020 17:41)  Source: .CSF CSF  Gram Stain (2020 18:38):    No polymorphonuclear cells seen    No organisms seen    by cytocentrifuge        RADIOLOGY & ADDITIONAL TESTS:  Results Reviewed:   Imaging Personally Reviewed:  Electrocardiogram Personally Reviewed:    COORDINATION OF CARE:  Care Discussed with Consultants/Other Providers [Y/N]: Y  Prior or Outpatient Records Reviewed [Y/N]: Y   PROGRESS NOTE:     CONTACT INFO:  Laura Quiroga MD | PGY-1  Pager: [phone] [place], [phone] [facility]  After 7pm page night float      Patient is a 24y old  Female who presents with a chief complaint of new onset stutter and R hand tremor (2020 18:05)      SUBJECTIVE / OVERNIGHT EVENTS:    - No acute events overnight.   - No fevers/chills.  - Patient seen and evaluated at bedside.  - Denies SOB at rest, chest pain, palpitations, abdominal pain, nausea/vomiting  - Still symptomatic. Still w/ HA, stutter. Now reporting heaviness in  muscle when chewing, requesting puree diet. Denying dysphagia.    ADDITIONAL REVIEW OF SYSTEMS:    MEDICATIONS  (STANDING):  amitriptyline 10 milliGRAM(s) Oral daily  sodium chloride 0.9%. 1000 milliLiter(s) (70 mL/Hr) IV Continuous <Continuous>    MEDICATIONS  (PRN):      CAPILLARY BLOOD GLUCOSE        I&O's Summary    2020 07:01  -  2020 07:00  --------------------------------------------------------  IN: 810 mL / OUT: 0 mL / NET: 810 mL    2020 07:01  -  2020 13:39  --------------------------------------------------------  IN: 240 mL / OUT: 0 mL / NET: 240 mL        PHYSICAL EXAM:  Vital Signs Last 24 Hrs  T(C): 36.7 (2020 12:04), Max: 37.8 (2020 13:50)  T(F): 98.1 (2020 12:04), Max: 100.1 (2020 13:50)  HR: 83 (2020 12:04) (83 - 97)  BP: 117/80 (2020 12:04) (102/74 - 128/76)  BP(mean): --  RR: 17 (2020 12:04) (16 - 18)  SpO2: 98% (2020 12:04) (98% - 100%)    Physical Exam: PHYSICAL EXAM:  GENERAL: NAD, well-groomed, well-developed, obese  HEAD:  Atraumatic, Normocephalic  EYES:  conjunctiva and sclera clear  ENMT: No tonsillar erythema, exudates, or enlargement; Moist mucous membranes, Good dentition, No lesions, uvula pointing to left  NECK: Supple, No JVD, Normal thyroid  CHEST/LUNG: CTAB, No rales, rhonchi, wheezing, or rubs  HEART: Regular rate and rhythm; No murmurs, rubs, or gallops  ABDOMEN: Soft, Nontender, Nondistended; Bowel sounds present  EXTREMITIES:  2+ Peripheral Pulses, No clubbing, cyanosis, or edema  LYMPH: No lymphadenopathy noted  SKIN: No rashes or lesions  NERVOUS SYSTEM:  Alert & Oriented X3, Good concentration; + stutter +R arm/hand tremor intermittent; able to write without tremor   visual fields full, EOMI w/ some ?nystagmus on leftward gaze, pupils reactive to light but dilate again while light shining, decreased CNV on L, symmetric smile and eyebrow raise, asymmetric uvula (but appears Leftward pointing without palate elevation), decreased CN VIII on left, tongue symmetric  Strength: 5/5 strength on UE initially but with repeated movement becomes weak (decremental decrease in response). RLE 5/5, LLE 4/5  Sensory: decreased sensation of LUE, and LLE  Cerebellar: no dysmetria on FNF, no dysdiadokinesia  PSYCH: aaox3, appropriate, pleasant, reporting normal mood normally though currently feeling frustrated d/t stutter; thought process linear, with good content      LABS:                        12.1   5.38  )-----------( 276      ( 2020 07:29 )             37.8     11-05    137  |  106  |  8   ----------------------------<  96  3.4<L>   |  23  |  0.55    Ca    9.1      2020 07:28  Phos  3.3     11-05  Mg     1.8     11-05    TPro  6.2  /  Alb  3.7  /  TBili  0.3  /  DBili  x   /  AST  17  /  ALT  14  /  AlkPhos  83  11-05    PT/INR - ( 2020 12:59 )   PT: 11.4 sec;   INR: 0.95 ratio         PTT - ( 2020 12:59 )  PTT:38.2 sec      Urinalysis Basic - ( 2020 14:06 )    Color: Colorless / Appearance: Clear / S.033 / pH: x  Gluc: x / Ketone: Negative  / Bili: Negative / Urobili: Negative   Blood: x / Protein: Negative / Nitrite: Negative   Leuk Esterase: Negative / RBC: 1 /hpf / WBC 1 /HPF   Sq Epi: x / Non Sq Epi: 2 /hpf / Bacteria: Negative        Culture - CSF with Gram Stain (collected 2020 17:41)  Source: .CSF CSF  Gram Stain (2020 18:38):    No polymorphonuclear cells seen    No organisms seen    by cytocentrifuge        RADIOLOGY & ADDITIONAL TESTS:  Results Reviewed:   Imaging Personally Reviewed:  Electrocardiogram Personally Reviewed:    COORDINATION OF CARE:  Care Discussed with Consultants/Other Providers [Y/N]: Y  Prior or Outpatient Records Reviewed [Y/N]: Y   PROGRESS NOTE:     CONTACT INFO:  Laura Quiroga MD | PGY-1  Pager: [phone] [place], [phone] [facility]  After 7pm page night float      Patient is a 24y old  Female who presents with a chief complaint of new onset stutter and R hand tremor (2020 18:05)      SUBJECTIVE / OVERNIGHT EVENTS:    - No acute events overnight.   - No fevers/chills.  - Patient seen and evaluated at bedside.  - Denies SOB at rest, chest pain, palpitations, abdominal pain, nausea/vomiting  - Still symptomatic. Still w/ HA, stutter. Now reporting heaviness in  muscle when chewing, requesting puree diet. Denying dysphagia.    ADDITIONAL REVIEW OF SYSTEMS: SEE ABOVE     MEDICATIONS  (STANDING):  amitriptyline 10 milliGRAM(s) Oral daily  sodium chloride 0.9%. 1000 milliLiter(s) (70 mL/Hr) IV Continuous <Continuous>    MEDICATIONS  (PRN):      CAPILLARY BLOOD GLUCOSE        I&O's Summary    2020 07:01  -  2020 07:00  --------------------------------------------------------  IN: 810 mL / OUT: 0 mL / NET: 810 mL    2020 07:01  -  2020 13:39  --------------------------------------------------------  IN: 240 mL / OUT: 0 mL / NET: 240 mL        PHYSICAL EXAM:  Vital Signs Last 24 Hrs  T(C): 36.7 (2020 12:04), Max: 37.8 (2020 13:50)  T(F): 98.1 (2020 12:04), Max: 100.1 (2020 13:50)  HR: 83 (2020 12:04) (83 - 97)  BP: 117/80 (2020 12:04) (102/74 - 128/76)  BP(mean): --  RR: 17 (2020 12:04) (16 - 18)  SpO2: 98% (2020 12:04) (98% - 100%)    Physical Exam: PHYSICAL EXAM:  GENERAL: NAD, well-groomed, well-developed, obese  HEAD:  Atraumatic, Normocephalic  EYES:  conjunctiva and sclera clear  ENMT: No tonsillar erythema, exudates, or enlargement; Moist mucous membranes, Good dentition, No lesions, uvula pointing to left  NECK: Supple, No JVD, Normal thyroid  CHEST/LUNG: CTAB, No rales, rhonchi, wheezing, or rubs  HEART: Regular rate and rhythm; No murmurs, rubs, or gallops  ABDOMEN: Soft, Nontender, Nondistended; Bowel sounds present  EXTREMITIES:  2+ Peripheral Pulses, No clubbing, cyanosis, or edema  LYMPH: No lymphadenopathy noted  SKIN: No rashes or lesions  NERVOUS SYSTEM:  Alert & Oriented X3, Good concentration; + stutter +R arm/hand tremor intermittent; able to write without tremor   visual fields full, EOMI w/ some ?nystagmus on leftward gaze, pupils reactive to light but dilate again while light shining, decreased CNV on L, symmetric smile and eyebrow raise, asymmetric uvula (but appears Leftward pointing without palate elevation), decreased CN VIII on left, tongue symmetric  Strength: 5/5 strength on UE initially but with repeated movement becomes weak (decremental decrease in response). RLE 5/5, LLE 4/5  Sensory: decreased sensation of LUE, and LLE  Cerebellar: no dysmetria on FNF, no dysdiadokinesia  PSYCH: aaox3, appropriate, pleasant, reporting normal mood normally though currently feeling frustrated d/t stutter; thought process linear, with good content      LABS:                        12.1   5.38  )-----------( 276      ( 2020 07:29 )             37.8     11-05    137  |  106  |  8   ----------------------------<  96  3.4<L>   |  23  |  0.55    Ca    9.1      2020 07:28  Phos  3.3     11-05  Mg     1.8     11-05    TPro  6.2  /  Alb  3.7  /  TBili  0.3  /  DBili  x   /  AST  17  /  ALT  14  /  AlkPhos  83  11-05    PT/INR - ( 2020 12:59 )   PT: 11.4 sec;   INR: 0.95 ratio         PTT - ( 2020 12:59 )  PTT:38.2 sec      Urinalysis Basic - ( 2020 14:06 )    Color: Colorless / Appearance: Clear / S.033 / pH: x  Gluc: x / Ketone: Negative  / Bili: Negative / Urobili: Negative   Blood: x / Protein: Negative / Nitrite: Negative   Leuk Esterase: Negative / RBC: 1 /hpf / WBC 1 /HPF   Sq Epi: x / Non Sq Epi: 2 /hpf / Bacteria: Negative        Culture - CSF with Gram Stain (collected 2020 17:41)  Source: .CSF CSF  Gram Stain (2020 18:38):    No polymorphonuclear cells seen    No organisms seen    by cytocentrifuge        RADIOLOGY & ADDITIONAL TESTS:  Results Reviewed:   Imaging Personally Reviewed:  Electrocardiogram Personally Reviewed:    COORDINATION OF CARE:  Care Discussed with Consultants/Other Providers [Y/N]: Y  Prior or Outpatient Records Reviewed [Y/N]: Y

## 2020-11-06 LAB
ANION GAP SERPL CALC-SCNC: 11 MMOL/L — SIGNIFICANT CHANGE UP (ref 5–17)
BUN SERPL-MCNC: 8 MG/DL — SIGNIFICANT CHANGE UP (ref 7–23)
CALCIUM SERPL-MCNC: 9 MG/DL — SIGNIFICANT CHANGE UP (ref 8.4–10.5)
CHLORIDE SERPL-SCNC: 106 MMOL/L — SIGNIFICANT CHANGE UP (ref 96–108)
CO2 SERPL-SCNC: 22 MMOL/L — SIGNIFICANT CHANGE UP (ref 22–31)
CREAT SERPL-MCNC: 0.6 MG/DL — SIGNIFICANT CHANGE UP (ref 0.5–1.3)
GLUCOSE SERPL-MCNC: 84 MG/DL — SIGNIFICANT CHANGE UP (ref 70–99)
HCT VFR BLD CALC: 40.4 % — SIGNIFICANT CHANGE UP (ref 34.5–45)
HGB BLD-MCNC: 12.6 G/DL — SIGNIFICANT CHANGE UP (ref 11.5–15.5)
MAGNESIUM SERPL-MCNC: 2.1 MG/DL — SIGNIFICANT CHANGE UP (ref 1.6–2.6)
MCHC RBC-ENTMCNC: 28.2 PG — SIGNIFICANT CHANGE UP (ref 27–34)
MCHC RBC-ENTMCNC: 31.2 GM/DL — LOW (ref 32–36)
MCV RBC AUTO: 90.4 FL — SIGNIFICANT CHANGE UP (ref 80–100)
NRBC # BLD: 0 /100 WBCS — SIGNIFICANT CHANGE UP (ref 0–0)
PHOSPHATE SERPL-MCNC: 3.3 MG/DL — SIGNIFICANT CHANGE UP (ref 2.5–4.5)
PLATELET # BLD AUTO: 287 K/UL — SIGNIFICANT CHANGE UP (ref 150–400)
POTASSIUM SERPL-MCNC: 3.8 MMOL/L — SIGNIFICANT CHANGE UP (ref 3.5–5.3)
POTASSIUM SERPL-SCNC: 3.8 MMOL/L — SIGNIFICANT CHANGE UP (ref 3.5–5.3)
RBC # BLD: 4.47 M/UL — SIGNIFICANT CHANGE UP (ref 3.8–5.2)
RBC # FLD: 13 % — SIGNIFICANT CHANGE UP (ref 10.3–14.5)
SODIUM SERPL-SCNC: 139 MMOL/L — SIGNIFICANT CHANGE UP (ref 135–145)
WBC # BLD: 5.86 K/UL — SIGNIFICANT CHANGE UP (ref 3.8–10.5)
WBC # FLD AUTO: 5.86 K/UL — SIGNIFICANT CHANGE UP (ref 3.8–10.5)

## 2020-11-06 PROCEDURE — 99233 SBSQ HOSP IP/OBS HIGH 50: CPT | Mod: GC

## 2020-11-06 PROCEDURE — 99223 1ST HOSP IP/OBS HIGH 75: CPT | Mod: GC

## 2020-11-06 PROCEDURE — 72156 MRI NECK SPINE W/O & W/DYE: CPT | Mod: 26

## 2020-11-06 PROCEDURE — 95816 EEG AWAKE AND DROWSY: CPT | Mod: 26

## 2020-11-06 PROCEDURE — 90792 PSYCH DIAG EVAL W/MED SRVCS: CPT

## 2020-11-06 PROCEDURE — 72158 MRI LUMBAR SPINE W/O & W/DYE: CPT | Mod: 26

## 2020-11-06 PROCEDURE — 72157 MRI CHEST SPINE W/O & W/DYE: CPT | Mod: 26

## 2020-11-06 RX ORDER — SODIUM CHLORIDE 9 MG/ML
1000 INJECTION INTRAMUSCULAR; INTRAVENOUS; SUBCUTANEOUS
Refills: 0 | Status: DISCONTINUED | OUTPATIENT
Start: 2020-11-06 | End: 2020-11-10

## 2020-11-06 RX ORDER — SUMATRIPTAN SUCCINATE 4 MG/.5ML
100 INJECTION, SOLUTION SUBCUTANEOUS ONCE
Refills: 0 | Status: COMPLETED | OUTPATIENT
Start: 2020-11-06 | End: 2020-11-06

## 2020-11-06 RX ORDER — SUMATRIPTAN SUCCINATE 4 MG/.5ML
100 INJECTION, SOLUTION SUBCUTANEOUS EVERY 12 HOURS
Refills: 0 | Status: DISCONTINUED | OUTPATIENT
Start: 2020-11-06 | End: 2020-11-07

## 2020-11-06 RX ADMIN — Medication 170 MILLIGRAM(S): at 04:17

## 2020-11-06 RX ADMIN — SUMATRIPTAN SUCCINATE 100 MILLIGRAM(S): 4 INJECTION, SOLUTION SUBCUTANEOUS at 22:00

## 2020-11-06 RX ADMIN — SUMATRIPTAN SUCCINATE 100 MILLIGRAM(S): 4 INJECTION, SOLUTION SUBCUTANEOUS at 16:20

## 2020-11-06 RX ADMIN — Medication 250 MILLIGRAM(S): at 14:30

## 2020-11-06 RX ADMIN — Medication 0.5 MILLIGRAM(S): at 17:23

## 2020-11-06 RX ADMIN — Medication 250 MILLIGRAM(S): at 13:20

## 2020-11-06 RX ADMIN — SUMATRIPTAN SUCCINATE 100 MILLIGRAM(S): 4 INJECTION, SOLUTION SUBCUTANEOUS at 21:31

## 2020-11-06 RX ADMIN — SODIUM CHLORIDE 100 MILLILITER(S): 9 INJECTION INTRAMUSCULAR; INTRAVENOUS; SUBCUTANEOUS at 10:09

## 2020-11-06 RX ADMIN — SUMATRIPTAN SUCCINATE 100 MILLIGRAM(S): 4 INJECTION, SOLUTION SUBCUTANEOUS at 15:20

## 2020-11-06 NOTE — CONSULT NOTE ADULT - ASSESSMENT
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 One low grade temperature to 100.4 on admission resolved, meningitis work up negative to date and no other clear signs of infectious process.    HA, Neurologic deficits  -MRI, CTA brain findings appreciated  -pt with one low grade fever of unclear significance LP with normal protein, glucose, 1 nucleated cell not c/w meningitis. CSF cx, CSF HSV PCR neg. CSF VDRL and [place] pending. Do not see CSF PCR?  -isolated fever, no other localizing signs of infection. 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 One low grade temperature to 100.4 on admission resolved, meningitis work up negative to date and no other clear signs of infectious process.    HA, Neurologic deficits  -MRI, CTA brain findings appreciated  -pt with one low grade fever of unclear significance LP with normal protein, glucose, 1 nucleated cell not c/w meningitis. CSF cx, CSF HSV PCR neg. CSF VDRL and [place] pending. Do not see CSF PCR?  -isolated fever, no other localizing signs of infection. No leukocytosis, Bcx negative x 2 sets, CXR unremarkable, UA/Ucx negative  -hx of treated latent TB, CSF not c/w TB, nor is acute onset  -No evidence of infectious process, CSF HSV already negative, would discontinue acyclovir.    Hx of Latent TB  -s/p 6 months of treatment per patient 2018 after positive quantiferon, unknown agent, presumably INH
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
## 2020-11-06 NOTE — EEG REPORT - NS EEG TEXT BOX
[facility]   COMPREHENSIVE EPILEPSY CENTER   REPORT OF ROUTINE VIDEO EEG     [facility]: [address] [place] [address] [address], [place] [address], Ph#: [phone]  [facility]: [address], [place] [address], Ph#: [phone]  [facility]: [address] [place] [address], Ph#: [phone]    Patient Name: SABA LA  Age and : 24y ([birth year])  MRN #: [id number]  Location: [address] [address]   Referring Physician: Adrianne Desai    Study Date: 20    _____________________________________________________________  TECHNICAL INFORMATION    Placement and Labeling of Electrodes:  The EEG was performed utilizing 20 channels referential EEG connections (coronal over temporal over parasagittal montage) using all standard 10-20 electrode placements with EKG.  Recording was at a sampling rate of 256 samples per second per channel.  Time synchronized digital video recording was done simultaneously with EEG recording.  A low light infrared camera was used for low light recording.  Preston and seizure detection algorithms were utilized.    _____________________________________________________________  HISTORY    Patient is a 24y old  Female who presents with a chief complaint of new onset stutter and R hand tremor (2020 10:06)      PERTINENT MEDICATION:  none  _____________________________________________________________  STUDY INTERPRETATION    Findings: The background was continuous, spontaneously variable and reactive. During wakefulness, the posterior dominant rhythm consisted of symmetric, well-modulated 10 Hz activity, with amplitude to 30 uV, that attenuated to eye opening.     Background Slowing:  No generalized background slowing was present.    Focal Slowing:   None were present.    Sleep Background:  Drowsiness was characterized by fragmentation, attenuation, and slowing of the background activity.    Stage II sleep transients were not recorded.    Other Non-Epileptiform Findings:  None were present.  Diffuse excess beta activity.  Attenuation of fast activity over the .  Attenuation of voltage over the .  Breach effect max in xx characterized by higher amplitude, sharply contoured waves and fast activities.    Interictal Epileptiform Activity:   None were present.    Events:  Clinical events: None recorded.  Seizures: None recorded.    Activation Procedures:   Hyperventilation was not performed.    Photic stimulation was not performed.     Artifacts:  Intermittent myogenic and movement artifacts were noted.    ECG:  The heart rate on single channel ECG was predominantly between 60-70 BPM.    _____________________________________________________________  EEG SUMMARY/CLASSIFICATION    Normal EEG in the awake, drowsy and asleep states  _____________________________________________________________  EEG IMPRESSION/CLINICAL CORRELATE    Otherwise normal EEG, except for diffuse excess beta activity, which may be seen with medication use such as benzodiazepines or barbiturates.    Stefan Ortiz MD PGY-5  Epilepsy Fellow    This Preliminary report is based on fellow review. Final report pending attending review. [facility]   COMPREHENSIVE EPILEPSY CENTER   REPORT OF ROUTINE VIDEO EEG     [facility]: [address] [place] [address] [address], [place] [address], Ph#: [phone]  [facility]: [address], [place] [address], Ph#: [phone]  [facility]: [address] [place] [address], Ph#: [phone]    Patient Name: SABA LA  Age and : 24y ([birth year])  MRN #: [id number]  Location: [address] [address]   Referring Physician: Adrianne Desai    Study Date: 20    _____________________________________________________________  TECHNICAL INFORMATION    Placement and Labeling of Electrodes:  The EEG was performed utilizing 20 channels referential EEG connections (coronal over temporal over parasagittal montage) using all standard 10-20 electrode placements with EKG.  Recording was at a sampling rate of 256 samples per second per channel.  Time synchronized digital video recording was done simultaneously with EEG recording.  A low light infrared camera was used for low light recording.  Preston and seizure detection algorithms were utilized.    _____________________________________________________________  HISTORY    Patient is a 24y old  Female who presents with a chief complaint of new onset stutter and R hand tremor (2020 10:06)      PERTINENT MEDICATION:  none  _____________________________________________________________  STUDY INTERPRETATION    Findings: The background was continuous, spontaneously variable and reactive. During wakefulness, the posterior dominant rhythm consisted of symmetric, well-modulated 10 Hz activity, with amplitude to 30 uV, that attenuated to eye opening.  diffuse beta activity    Background Slowing:  No generalized background slowing was present.    Focal Slowing:   None were present.    Sleep Background:  Drowsiness was characterized by fragmentation, attenuation, and slowing of the background activity.    Stage II sleep transients were not recorded.      Interictal Epileptiform Activity:   None were present.    Events:  Clinical events: None recorded.  Seizures: None recorded.    Activation Procedures:   Hyperventilation was not performed.    Photic stimulation was not performed.     Artifacts:  Intermittent myogenic and movement artifacts were noted.    ECG:  The heart rate on single channel ECG was predominantly between 60-70 BPM.    _____________________________________________________________  EEG SUMMARY/CLASSIFICATION    Normal EEG in the awake, drowsy and asleep states  diffuse beta activity  _____________________________________________________________  EEG IMPRESSION/CLINICAL CORRELATE    Diffuse beta activity, which may be normal variant, though typically seen with medication use such as benzodiazepines or barbiturates.    Stefan Ortiz MD PGY-5  Epilepsy Fellow

## 2020-11-06 NOTE — CONSULT NOTE ADULT - ATTENDING COMMENTS
Pt is a 25 yo woan with history of migraines on Amitriptyline (previously had rash with topiramate.) who presented as a code stroke with new onset of marked stutter and R arm tremor starting after reported episode of LOC approximately 1 hour prior to presentation on 11/4. Headache on admission now resolved but still with persistent stutter.  On exam, the patient's language is intact with the exception of the stutter which consists of interittent repetition of whole words, beginning or ending of words.  tremor now resolved.  CT and CTA negative.      Recommendations:   - MRI w/wo contrast  -speech and swallow evaluation  consider change amitriptyline ot nortriptyline and increase dose- vs change to venlafaxine.  Would consider for trial of rizatriptan vs cgrp antagonist (ubrelvy or nurtec.)
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 One low grade temperature to 100.4 on admission resolved  WBC normal, s/p LP with 1 WBC, pr: 19, Glu: 59, HSV PCR and cx negative  brain MRI: Nonspecific focus of T2/FLAIR signal hyperintensity within the left parietal lobe on the images referenced above. This may represent a focus of infection inflammation demyelination or ischemia.     headache with sudden slurred speech and a fever of 100.4 but no evidence of meningoencephalitis, ?migraine related    * would add the CSF PCR just to have a complete w/u  * DC acyclovir and isolation  * f/u with neurology for migraine treatment  * monitor off antibiotics     The above assessment and plan was discussed with the primary team    Olivia Crabtree MD  Pager [phone]  After 5pm and on weekends call [phone]
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
## 2020-11-06 NOTE — BEHAVIORAL HEALTH ASSESSMENT NOTE - NSBHCHARTREVIEWLAB_PSY_A_CORE FT
CBC Full  -  ( 2020 07:05 )  WBC Count : 5.86 K/uL  RBC Count : 4.47 M/uL  Hemoglobin : 12.6 g/dL  Hematocrit : 40.4 %  Platelet Count - Automated : 287 K/uL  Mean Cell Volume : 90.4 fl  Mean Cell Hemoglobin : 28.2 pg  Mean Cell Hemoglobin Concentration : 31.2 gm/dL  Auto Neutrophil # : x  Auto Lymphocyte # : x  Auto Monocyte # : x  Auto Eosinophil # : x  Auto Basophil # : x  Auto Neutrophil % : x  Auto Lymphocyte % : x  Auto Monocyte % : x  Auto Eosinophil % : x  Auto Basophil % : x    11    139  |  106  |  8   ----------------------------<  84  3.8   |  22  |  0.60    Ca    9.0      2020 07:04  Phos  3.3     11-  Mg     2.1     -    TPro  6.2  /  Alb  3.7  /  TBili  0.3  /  DBili  x   /  AST  17  /  ALT  14  /  AlkPhos  83  11-05    LIVER FUNCTIONS - ( 2020 07:28 )  Alb: 3.7 g/dL / Pro: 6.2 g/dL / ALK PHOS: 83 U/L / ALT: 14 U/L / AST: 17 U/L / GGT: x           Urinalysis Basic - ( 2020 14:06 )    Color: Colorless / Appearance: Clear / S.033 / pH: x  Gluc: x / Ketone: Negative  / Bili: Negative / Urobili: Negative   Blood: x / Protein: Negative / Nitrite: Negative   Leuk Esterase: Negative / RBC: 1 /hpf / WBC 1 /HPF   Sq Epi: x / Non Sq Epi: 2 /hpf / Bacteria: Negative

## 2020-11-06 NOTE — CHART NOTE - NSCHARTNOTEFT_GEN_A_CORE
< from: MR Head w/wo IV Cont (11.05.20 @ 09:38) >      FINDINGS:  There is no intraparenchymal hematoma, mass effect or midline shift. No abnormal extra-axial fluid collections are present.    Age-appropriate ventricular size. No hydrocephalus. Basal cisterns are patent.    There is a focus T2/FLAIR signal hyperintensity within the left subcortical white matter at the level of the left parietal lobe (6, 16).    There is no diffusion abnormality to suggest acute or subacute infarction. Major flow-voids at the base of the brain follow expected course and contour.    The calvarium is intact. The visualized intraorbital compartments are within normal limits. Mucosal inclusion cyst versus polyps in bilateral maxillary sinuses. Bilateral ethmoid sinus thickening.    IMPRESSION:  Nonspecific focus of T2/FLAIR signal hyperintensity within the left parietal lobe on the images referenced above. This may represent a focus of infection inflammation demyelination or ischemia. Clinical correlation recommended.    < end of copied text >    Plan:  MRI brain reviewed. Findings consistent with changes that can be seen in migraine.   No further inpatient neurologic workup at this time.  PT/OT, SLP    Case discussed with neurology attending, Dr. Ruiz, and primary team.

## 2020-11-06 NOTE — BEHAVIORAL HEALTH ASSESSMENT NOTE - NSBHCHARTREVIEWVS_PSY_A_CORE FT
ICU Vital Signs Last 24 Hrs  T(C): 36.4 (06 Nov 2020 05:21), Max: 36.7 (05 Nov 2020 16:45)  T(F): 97.5 (06 Nov 2020 05:21), Max: 98 (05 Nov 2020 16:45)  HR: 75 (06 Nov 2020 05:21) (75 - 99)  BP: 123/89 (06 Nov 2020 05:21) (117/79 - 123/89)  BP(mean): --  ABP: --  ABP(mean): --  RR: 18 (06 Nov 2020 05:21) (18 - 18)  SpO2: 99% (06 Nov 2020 05:21) (96% - 99%)

## 2020-11-06 NOTE — BEHAVIORAL HEALTH ASSESSMENT NOTE - RISK ASSESSMENT
Low Acute Suicide Risk Risk factors: acute/chronic medical issues    Protective factors: no current suicidal/homicidal ideations intent or plan, No previous suicide attempt or self injurious behavior, no h/o psych admissions, no active substance abuse, no psychosis, domiciled, social supports

## 2020-11-06 NOTE — PROGRESS NOTE ADULT - PROBLEM SELECTOR PLAN 5
# Fall/LOC  seizure vs cardiac vs hypotension/orthostasis; hx does not sound mechanical  -r/o sz as above  -tropes wnl  -repeat ECG (ecg in ED of poor quality). # Fall/LOC  seizure vs cardiac vs hypotension/orthostasis; hx does not sound mechanical  -r/o sz as above  -tropes wnl  - ECG wnl

## 2020-11-06 NOTE — BEHAVIORAL HEALTH ASSESSMENT NOTE - NSBHCONSULTFOLLOWAFTERCARE_PSY_A_CORE FT
pt is not interested in continued psychiatric care but may follow up at the [facility] walk in clinic at [phone]

## 2020-11-06 NOTE — BEHAVIORAL HEALTH ASSESSMENT NOTE - SUICIDE PROTECTIVE FACTORS
Has future plans/Supportive social network of family or friends/Engaged in work or school/Ability to cope with stress

## 2020-11-06 NOTE — PROGRESS NOTE ADULT - PROBLEM SELECTOR PLAN 1
#New onset stutter and tremor  MRI brain 11/5 Nonspecific focus of T2/FLAIR signal hyperintensity within the left parietal lobe on the images referenced above. This may represent a focus of infection inflammation demyelination or ischemia.  Ddx 2/2 concussion (but pt denying hitting head) vs. seizure (post ictal) vs meningitis (one time fever, no leukocytosis) vs MS vs other structural CNS lesion  vs mood/psych (though less likely as pt denying emotional sx appears euthymic, organized, pleasant)  -s/p LP unrevealing  -HSV negative on LP  -Syphilis negative  -IVF while giving acyclovir  -appreciate neuro c/s in ED - feels not to be a seizure  -MRI Brain w/ contrast: showing abnormality poss demyelination  -MRI C-spine, t-spine, c-spine, w/wo con, given sx/presentation and previous brain findings  -EEG.  -pt to work on obtaining CD from OSH for comparison  -speech eval for stutter #New onset stutter and tremor  MRI brain 11/5 Nonspecific focus of T2/FLAIR signal hyperintensity within the left parietal lobe on the images referenced above. This may represent a focus of infection inflammation demyelination or ischemia.As per d/w neuro on 11/6 can be seen in migraine. Does not appear to be meningitis as LP unrevealing, HSV negative, Syphilis negative.  -DC acyclovir  -appreciate neuro c/s in ED - feels not to be a seizure or neurological in origin  -MRI C-spine, t-spine, c-spine, w/wo con, given sx/presentation and previous brain findings  -EEG.  -pt to work on obtaining CD from OSH for comparison  -speech eval for stutter  -Psych consult for poss conversion disorder

## 2020-11-06 NOTE — PROGRESS NOTE ADULT - PROBLEM SELECTOR PLAN 2
#LLE weakness w/ numbness ; now weaknes improved and no longer with numbness  as above  decremental decrease in strength on exam - has good strength initially but with continued exertion fatigues. #LLE LUE weakness w/ numbness ; now weakness improved and no longer with numbness  as above  decremental decrease in strength on exam - has good strength initially but with continued exertion fatigues.

## 2020-11-06 NOTE — PROGRESS NOTE ADULT - PROBLEM SELECTOR PLAN 3
MRI at [facility] in 08/2020:  1. 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   2. Non specific cerebral white matter lesions unchanged. Small vessel ischemic disease is not likely at this age unless there is an underlying vasculitis/vasculopathy. The findings are not suggestive of demyelinating disease"   "Small areas of signal abnormality in subcortical parietal regions".    As above

## 2020-11-06 NOTE — BEHAVIORAL HEALTH ASSESSMENT NOTE - NSBHCHARTREVIEWINVESTIGATE_PSY_A_CORE FT
Ventricular Rate 81 BPM    Atrial Rate 81 BPM    P-R Interval 150 ms    QRS Duration 76 ms    Q-T Interval 400 ms    QTC Calculation(Bazett) 464 ms    P Axis 23 degrees    R Axis 52 degrees    T Axis 31 degrees    Diagnosis Line NORMAL SINUS RHYTHM  NORMAL ECG  WHEN COMPARED WITH ECG OF 04-NOV-2020 13:28, (UNCONFIRMED)  BASELINE ARTIFACT NO LONGER NOTED  Confirmed by MD Duc, Bari ([id number]) on 11/5/2020 7:48:33 AM

## 2020-11-06 NOTE — PROGRESS NOTE ADULT - SUBJECTIVE AND OBJECTIVE BOX
PROGRESS NOTE:     CONTACT INFO:  Laura Quiroga MD | PGY-1  Pager: [phone] [place], [address] [facility]  After 7pm page night float      Patient is a 24y old  Female who presents with a chief complaint of new onset stutter and R hand tremor (2020 13:38)      SUBJECTIVE / OVERNIGHT EVENTS:    - No acute events overnight.   - No fevers/chills.  - Patient seen and evaluated at bedside.  - Denies SOB at rest, chest pain, palpitations, abdominal pain, nausea/vomiting  - Reports L-sided weakness improved. Denies any numbness or tingling.  - Still w/ stutter. Still c/o R hand tremor.  - Had worse HA when she got up to go to the bathroom (walking, not bearing down).    ADDITIONAL REVIEW OF SYSTEMS:    MEDICATIONS  (STANDING):  acyclovir IVPB 1000 milliGRAM(s) IV Intermittent every 8 hours  amitriptyline 10 milliGRAM(s) Oral daily  sodium chloride 0.9%. 1000 milliLiter(s) (75 mL/Hr) IV Continuous <Continuous>    MEDICATIONS  (PRN):      CAPILLARY BLOOD GLUCOSE        I&O's Summary    2020 07:01  -  2020 07:00  --------------------------------------------------------  IN: 2335 mL / OUT: 0 mL / NET: 2335 mL        PHYSICAL EXAM:  Vital Signs Last 24 Hrs  T(C): 36.4 (2020 05:21), Max: 36.7 (2020 12:04)  T(F): 97.5 (2020 05:21), Max: 98.1 (2020 12:04)  HR: 75 (2020 05:21) (75 - 99)  BP: 123/89 (2020 05:21) (117/79 - 123/89)  BP(mean): --  RR: 18 (2020 05:21) (17 - 18)  SpO2: 99% (2020 05:21) (96% - 99%)    CONSTITUTIONAL: NAD, well-developed  RESPIRATORY: Normal respiratory effort; lungs are clear to auscultation bilaterally  CARDIOVASCULAR: Regular rate and rhythm, normal S1 and S2, no murmur/rub/gallop; No lower extremity edema; Peripheral pulses are 2+ bilaterally  ABDOMEN: Nontender to palpation, normoactive bowel sounds, no rebound/guarding; No hepatosplenomegaly  MUSCLOSKELETAL: no clubbing or cyanosis of digits; no joint swelling or tenderness to palpation  PSYCH: A+O to person, place, and time; affect appropriate  NERVOUS SYSTEM:  Alert & Oriented X3, Good concentration; + stutter +no tremor noted  visual fields full, EOMI w/ some ?nystagmus on leftward gaze, pupils reactive to light but dilate again while light shining, decreased CNV on L, symmetric smile and eyebrow raise, asymmetric uvula (but appears Leftward pointing without palate elevation), decreased CN VIII on left, tongue symmetric  Strength: b/l UE 5/5 strength improved, no decremental response noted. RLE 5/5, LLE 5/5  Sensory: decreased sensation of LUE, and LLE  Cerebellar: no dysmetria on FNF, no dysdiadokinesia  PSYCH: aaox3, appropriate, pleasant, reporting normal mood normally though currently feeling frustrated d/t stutter; thought process linear, with good content    LABS:                        12.6   5.86  )-----------( 287      ( 2020 07:05 )             40.4     11-06    139  |  106  |  8   ----------------------------<  84  3.8   |  22  |  0.60    Ca    9.0      2020 07:04  Phos  3.3     11-06  Mg     2.1     11-06    TPro  6.2  /  Alb  3.7  /  TBili  0.3  /  DBili  x   /  AST  17  /  ALT  14  /  AlkPhos  83  11-05    PT/INR - ( 2020 12:59 )   PT: 11.4 sec;   INR: 0.95 ratio         PTT - ( 2020 12:59 )  PTT:38.2 sec      Urinalysis Basic - ( 2020 14:06 )    Color: Colorless / Appearance: Clear / S.033 / pH: x  Gluc: x / Ketone: Negative  / Bili: Negative / Urobili: Negative   Blood: x / Protein: Negative / Nitrite: Negative   Leuk Esterase: Negative / RBC: 1 /hpf / WBC 1 /HPF   Sq Epi: x / Non Sq Epi: 2 /hpf / Bacteria: Negative        Culture - Blood (collected 2020 18:31)  Source: .Blood Blood  Preliminary Report (2020 19:01):    No growth to date.    Culture - Blood (collected 2020 18:31)  Source: .Blood Blood  Preliminary Report (2020 19:01):    No growth to date.    Culture - Urine (collected 2020 17:50)  Source: .Urine Clean Catch (Midstream)  Final Report (2020 22:09):    <10,000 CFU/mL Normal Urogenital Libby    Culture - CSF with Gram Stain (collected 2020 17:41)  Source: .CSF CSF  Gram Stain (2020 18:38):    No polymorphonuclear cells seen    No organisms seen    by cytocentrifuge  Preliminary Report (2020 20:44):    No growth        RADIOLOGY & ADDITIONAL TESTS:  Results Reviewed:   Imaging Personally Reviewed:  Electrocardiogram Personally Reviewed:    COORDINATION OF CARE:  Care Discussed with Consultants/Other Providers [Y/N]: Y  Prior or Outpatient Records Reviewed [Y/N]: Y

## 2020-11-06 NOTE — PROGRESS NOTE ADULT - ASSESSMENT
25 yo F w/ hx of migraine (on Amitriptyline), pituitary lesion and nonspecific cerebral white matter lesions  seen on MRI Head at [facility] in 08/2020 ,who presents as a code stroke for sudden onset stutter and R hand tremor. On adm w/ fever Tmax 100.4 x1, no leukocytosis or electrolyte abnlties. CTH and CTA wnl in ED;  LP unrevealing. MRI brain showing nonspecific focus of T2/flair signal hyperintensity within the L parietal  lobe  which could be infection vs demylination vs ischemia.        Ddx 2/2 multiple sclerosis/ other dymyelinating dz vs. concussion (but pt denying hitting head) vs. seizure (post ictal)  vs other structural CNS lesion  vs vasculitis vs mood/psych (though less likely as pt denying emotional sx appears euthymic, organized, pleasant) vs meningiits   23 yo F w/ hx of migraine (on Amitriptyline), pituitary lesion and nonspecific cerebral white matter lesions  seen on MRI Head at [facility] in 08/2020 ,who presents as a code stroke for sudden onset stutter and R hand tremor. On adm w/ fever Tmax 100.4 x1, no leukocytosis or electrolyte abnlties. CTH and CTA wnl in ED;  LP unrevealing. MRI brain showing nonspecific focus of T2/flair signal hyperintensity within the L parietal  lobe  which could be infection vs demylination vs ischemia.        Ddx 2/2 multiple sclerosis/ other dymyelinating dz vs. concussion (but pt denying hitting head) vs. seizure (post ictal)  vs other structural CNS lesion vs conversion disorder  vs vasculitis vs mood/psych (though less likely as pt denying emotional sx appears euthymic, organized, pleasant)
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
## 2020-11-06 NOTE — CONSULT NOTE ADULT - SUBJECTIVE AND OBJECTIVE BOX
Laila Torres - [phone]    Patient is a 24y old  Female who presents with a chief complaint of new onset stutter and R hand tremor (2020 08:20)    HPI:  23 y/o F PMHx of migraine ( on Amitryptyline), non-enhancing pituitary lesion and non-specific cerebral white matter changes being followed at [facility] was brought in for sudden onset of stutter and right hand tremor.    In the hospital, pt with isoalted fever to 100.4 then defervesced, no leukocytosis with unremarkable differential, UA, COIVD PCR, Ucx, Bcx negative, LFTs WNL.  CXR negatiev, CTA brain/neck unremarkable, MRI brain with non-specific T2 flair hyperintensity signal of L parietal lobe. LP was performed showing 1 nucleated cell, Glucose 59, Protein 19, CSF Cx and CSF HSV PCR negative, serum HIV and Syphilis testing neg.    Pt recieved one time does of vancomycin and CTX and was continued on acyclovir.      prior hospital charts reviewed [ x ]  primary team notes reviewed [x  ]  other consultant notes reviewed [ x ]    PAST MEDICAL & SURGICAL HISTORY:  Migraine    Gastritis    Metatarsal bone fracture    No significant past surgical history      ANTIMICROBIALS:    Allergies  No Known Allergies    ANTIMICROBIALS (past 90 days)  MEDICATIONS  (STANDING):    acyclovir IVPB   265 mL/Hr IV Intermittent (20 @ 16:33)    acyclovir IVPB   265 mL/Hr IV Intermittent (20 @ 05:22)   265 mL/Hr IV Intermittent (20 @ 21:26)    acyclovir IVPB   170 mL/Hr IV Intermittent (20 @ 04:17)   170 mL/Hr IV Intermittent (20 @ 18:31)    cefTRIAXone   IVPB   100 mL/Hr IV Intermittent (20 @ 15:05)    vancomycin  IVPB   250 mL/Hr IV Intermittent (20 @ 15:41)        acyclovir IVPB 1000 every 8 hours    OTHER MEDS: MEDICATIONS  (STANDING):    SOCIAL HISTORY:   hx smoking  non-smoker    FAMILY HISTORY:    REVIEW OF SYSTEMS  [  ] ROS unobtainable because:    [  ] All other systems negative except as noted below:	    Constitutional:  [ ] fever [ ] chills  [ ] weight loss  [ ] weakness  Skin:  [ ] rash [ ] phlebitis	  Eyes: [ ] icterus [ ] pain  [ ] discharge	  ENMT: [ ] sore throat  [ ] thrush [ ] ulcers [ ] exudates  Respiratory: [ ] dyspnea [ ] hemoptysis [ ] cough [ ] sputum	  Cardiovascular:  [ ] chest pain [ ] palpitations [ ] edema	  Gastrointestinal:  [ ] nausea [ ] vomiting [ ] diarrhea [ ] constipation [ ] pain	  Genitourinary:  [ ] dysuria [ ] frequency [ ] hematuria [ ] discharge [ ] flank pain  [ ] incontinence  Musculoskeletal:  [ ] myalgias [ ] arthralgias [ ] arthritis  [ ] back pain  Neurological:  [ ] headache [ ] seizures  [ ] confusion/altered mental status  Psychiatric:  [ ] anxiety [ ] depression	  Hematology/Lymphatics:  [ ] lymphadenopathy  Endocrine:  [ ] adrenal [ ] thyroid  Allergic/Immunologic:	 [ ] transplant [ ] seasonal    Vital Signs Last 24 Hrs  T(F): 97.5 (20 @ 05:21), Max: 100.4 (20 @ 13:22)  Vital Signs Last 24 Hrs  HR: 75 (20 @ 05:21) (75 - 99)  BP: 123/89 (20 @ 05:21) (117/79 - 123/89)  RR: 18 (20 @ 05:21)  SpO2: 99% (20 @ 05:21) (96% - 99%)  Wt(kg): --    PHYSICAL EXAM:  Constitutional: non-toxic, no distress  HEAD/EYES: anicteric, no conjunctival injection  ENT:  supple, no thrush  Cardiovascular:   normal S1, S2, no murmur, no edema  Respiratory:  clear BS bilaterally, no wheezes, no rales  GI:  soft, non-tender, normal bowel sounds  :  no márquez, no CVA tenderness  Musculoskeletal:  no synovitis, normal ROM  Neurologic: awake and alert, normal strength, no focal findings  Skin:  no rash, no erythema, no phlebitis  Heme/Onc: no lymphadenopathy   Psychiatric:  awake, alert, appropriate mood                            12.6   5.86  )-----------( 287      ( 2020 07:05 )             40.4   11-    139  |  106  |  8   ----------------------------<  84  3.8   |  22  |  0.60    Ca    9.0      2020 07:04  Phos  3.3     11  Mg     2.1         TPro  6.2  /  Alb  3.7  /  TBili  0.3  /  DBili  x   /  AST  17  /  ALT  14  /  AlkPhos  83      Urinalysis Basic - ( 2020 14:06 )    Color: Colorless / Appearance: Clear / S.033 / pH: x  Gluc: x / Ketone: Negative  / Bili: Negative / Urobili: Negative   Blood: x / Protein: Negative / Nitrite: Negative   Leuk Esterase: Negative / RBC: 1 /hpf / WBC 1 /HPF   Sq Epi: x / Non Sq Epi: 2 /hpf / Bacteria: Negative    MICROBIOLOGY:  Culture - Blood (collected 2020 18:31)  Source: .Blood Blood  Preliminary Report (2020 19:01):    No growth to date.    Culture - Blood (collected 2020 18:31)  Source: .Blood Blood  Preliminary Report (2020 19:01):    No growth to date.    Culture - Urine (collected 2020 17:50)  Source: .Urine Clean Catch (Midstream)  Final Report (2020 22:09):    <10,000 CFU/mL Normal Urogenital Libby    Culture - CSF with Gram Stain (collected 2020 17:41)  Source: .CSF CSF  Gram Stain (2020 18:38):    No polymorphonuclear cells seen    No organisms seen    by cytocentrifuge  Preliminary Report (2020 20:44):    No growth              HSV 1/2 PCR: Alayna ( @ 02:40)      RADIOLOGY:  imaging below personally reviewed Laila Torres - [phone]    Patient is a 24y old  Female who presents with a chief complaint of new onset stutter and R hand tremor (2020 08:20)    HPI:  23 y/o F from [facility] PMHx of migraine ( on Amitryptyline), 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    Pt is still having issues verbalizing without significant stutter, communicated largely by writing on paper. Symptoms started suddenly on , HA worse than baseline in bandlinke distribution, bl/ tremors of UEs, weakness of LUE. She belives she had LOC, family member reportedly said no as per chart review. She has mild dry cough but denies SOB, productive cough, fever, chills, abdominal pain, diarrhea, dysuria, rash. She had some mild photophobia localized to left eye, some acute on chronic neck pain which resolved, no neck stiffness. Denies toxic habits, pet exposure, substance abuse, recent travel. No hx of STIs, oral or genital ulcers    In the hospital, pt with isoalted fever to 100.4 then defervesced, no leukocytosis with unremarkable differential, UA, COIVD PCR, Ucx, Bcx negative, LFTs WNL.  CXR negatiev, CTA brain/neck unremarkable, MRI brain with non-specific T2 flair hyperintensity signal of L parietal lobe. LP was performed showing 1 nucleated cell, Glucose 59, Protein 19, CSF Cx and CSF HSV PCR negative, serum HIV and Syphilis testing neg.    Pt received one time does of vancomycin and CTX and was continued on acyclovir.      prior hospital charts reviewed [ x ]  primary team notes reviewed [x  ]  other consultant notes reviewed [ x ]    PAST MEDICAL & SURGICAL HISTORY:  Migraine    Gastritis    Metatarsal bone fracture    No significant past surgical history      ANTIMICROBIALS:    Allergies  No Known Allergies    ANTIMICROBIALS (past 90 days)  MEDICATIONS  (STANDING):    acyclovir IVPB   265 mL/Hr IV Intermittent (20 @ 16:33)    acyclovir IVPB   265 mL/Hr IV Intermittent (20 @ 05:22)   265 mL/Hr IV Intermittent (20 @ 21:26)    acyclovir IVPB   170 mL/Hr IV Intermittent (20 @ 04:17)   170 mL/Hr IV Intermittent (20 @ 18:31)    cefTRIAXone   IVPB   100 mL/Hr IV Intermittent (20 @ 15:05)    vancomycin  IVPB   250 mL/Hr IV Intermittent (20 @ 15:41)        acyclovir IVPB 1000 every 8 hours    OTHER MEDS: MEDICATIONS  (STANDING):    SOCIAL HISTORY:   non-smoker no alcohol or drug use, from [facility] immigrated     FAMILY HISTORY: Grandfatther had leukemia    REVIEW OF SYSTEMS  [  ] ROS unobtainable because:    [ x ] All other systems negative except as noted below:	    Constitutional:  [ ] fever [ ] chills  [ ] weight loss  [ ] weakness  Skin:  [ ] rash [ ] phlebitis	  Eyes: [ ] icterus [ ] pain  [ ] discharge	  ENMT: [ ] sore throat  [ ] thrush [ ] ulcers [ ] exudates  Respiratory: [ ] dyspnea [ ] hemoptysis [x ] cough [ ] sputum	  Cardiovascular:  [ ] chest pain [ ] palpitations [ ] edema	  Gastrointestinal:  [ ] nausea [ ] vomiting [ ] diarrhea [ ] constipation [ ] pain	  Genitourinary:  [ ] dysuria [ ] frequency [ ] hematuria [ ] discharge [ ] flank pain  [ ] incontinence  Musculoskeletal:  [ ] myalgias [ ] arthralgias [ ] arthritis  [ ] back pain  Neurological:  [x ] headache [ ] seizures  [ ] confusion/altered mental status  Psychiatric:  [ ] anxiety [ ] depression	  Hematology/Lymphatics:  [ ] lymphadenopathy  Endocrine:  [ ] adrenal [ ] thyroid  Allergic/Immunologic:	 [ ] transplant [ ] seasonal    Vital Signs Last 24 Hrs  T(F): 97.5 (20 @ 05:21), Max: 100.4 (20 @ 13:22)  Vital Signs Last 24 Hrs  HR: 75 (20 @ 05:21) (75 - 99)  BP: 123/89 (20 @ 05:21) (117/79 - 123/89)  RR: 18 (20 @ 05:21)  SpO2: 99% (20 @ 05:21) (96% - 99%)  Wt(kg): --    PHYSICAL EXAM:  Constitutional: non-toxic, stuttering and struggling to form works  HEAD/EYES: anicteric, no conjunctival injection  ENT:  supple, no thrush  Cardiovascular:   normal S1, S2, no murmur, no edema  Respiratory:  clear BS bilaterally, no wheezes, no rales  GI:  soft, non-tender, normal bowel sounds  :  no márquez,   Musculoskeletal:  no synovitis, normal ROM  Neurologic: mild left eye photophobia, no nuchal rigidity, LUE motor strength 4/5, LLE 4-5/5  Skin:  no rash, no erythema, no phlebitis  Heme/Onc: no lymphadenopathy   Psychiatric:  awake, alert, appropriate mood                            12.6   5.86  )-----------( 287      ( 2020 07:05 )             40.4   11-06    139  |  106  |  8   ----------------------------<  84  3.8   |  22  |  0.60    Ca    9.0      2020 07:04  Phos  3.3     11-06  Mg     2.1     11-06    TPro  6.2  /  Alb  3.7  /  TBili  0.3  /  DBili  x   /  AST  17  /  ALT  14  /  AlkPhos  83  11-05    Urinalysis Basic - ( 2020 14:06 )    Color: Colorless / Appearance: Clear / S.033 / pH: x  Gluc: x / Ketone: Negative  / Bili: Negative / Urobili: Negative   Blood: x / Protein: Negative / Nitrite: Negative   Leuk Esterase: Negative / RBC: 1 /hpf / WBC 1 /HPF   Sq Epi: x / Non Sq Epi: 2 /hpf / Bacteria: Negative    MICROBIOLOGY:  Culture - Blood (collected 2020 18:31)  Source: .Blood Blood  Preliminary Report (2020 19:01):    No growth to date.    Culture - Blood (collected 2020 18:31)  Source: .Blood Blood  Preliminary Report (2020 19:01):    No growth to date.    Culture - Urine (collected 2020 17:50)  Source: .Urine Clean Catch (Midstream)  Final Report (2020 22:09):    <10,000 CFU/mL Normal Urogenital Libby    Culture - CSF with Gram Stain (collected 2020 17:41)  Source: .CSF CSF  Gram Stain (2020 18:38):    No polymorphonuclear cells seen    No organisms seen    by cytocentrifuge  Preliminary Report (2020 20:44):    No growth              HSV 1/2 PCR: Cristi ( @ 02:40)      RADIOLOGY:  < from: MR Head w/wo IV Cont (20 @ 09:38) >    IMPRESSION:  Nonspecific focus of T2/FLAIR signal hyperintensity within the left parietal lobe on the images referenced above. This may represent a focus of infection inflammation demyelination or ischemia. Clinical correlation recommended.    < from: CT Angio Head w/ IV Cont (20 @ 14:39) >  IMPRESSION:    CT brain:  No hydrocephalus, acute intracranial hemorrhage, mass effect, or brain edema.  No displaced calvarial fracture.    CTA neck:  No flow-limiting stenosis or evidence for arterial dissection.    CTA brain:  No flow-limiting stenosis, vascular aneurysm, or AVM.       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    Pt is still having issues verbalizing without significant stutter, communicated largely by writing on paper. Symptoms started suddenly on , HA worse than baseline in bandlinke distribution, bl/ tremors of UEs, weakness of LUE. She belives she had LOC, family member reportedly said no as per chart review. She has mild dry cough but denies SOB, productive cough, fever, chills, abdominal pain, diarrhea, dysuria, rash. She had some mild photophobia localized to left eye, some acute on chronic neck pain which resolved, no neck stiffness. Denies toxic habits, pet exposure, substance abuse, recent travel. No hx of STIs, oral or genital ulcers. May have had some mosquito bites some time ago, cannot say when.    In the hospital, pt with isoalted fever to 100.4 then defervesced, no leukocytosis with unremarkable differential, UA, COIVD PCR, Ucx, Bcx negative, LFTs WNL.  CXR negatiev, CTA brain/neck unremarkable, MRI brain with non-specific T2 flair hyperintensity signal of L parietal lobe. LP was performed showing 1 nucleated cell, Glucose 59, Protein 19, CSF Cx and CSF HSV PCR negative, serum HIV and Syphilis testing neg.    Pt received one time does of vancomycin and CTX and was continued on acyclovir.      prior hospital charts reviewed [ x ]  primary team notes reviewed [x  ]  other consultant notes reviewed [ x ]    PAST MEDICAL & SURGICAL HISTORY:  Migraine    Gastritis    Metatarsal bone fracture    No significant past surgical history      ANTIMICROBIALS:    Allergies  No Known Allergies    ANTIMICROBIALS (past 90 days)  MEDICATIONS  (STANDING):    acyclovir IVPB   265 mL/Hr IV Intermittent (20 @ 16:33)    acyclovir IVPB   265 mL/Hr IV Intermittent (20 @ 05:22)   265 mL/Hr IV Intermittent (20 @ 21:26)    acyclovir IVPB   170 mL/Hr IV Intermittent (20 @ 04:17)   170 mL/Hr IV Intermittent (20 @ 18:31)    cefTRIAXone   IVPB   100 mL/Hr IV Intermittent (20 @ 15:05)    vancomycin  IVPB   250 mL/Hr IV Intermittent (20 @ 15:41)        acyclovir IVPB 1000 every 8 hours    OTHER MEDS: MEDICATIONS  (STANDING):    SOCIAL HISTORY:   non-smoker no alcohol or drug use, from [facility] immigrated     FAMILY HISTORY: Grandfatther had leukemia    REVIEW OF SYSTEMS  [  ] ROS unobtainable because:    [ x ] All other systems negative except as noted below:	    Constitutional:  [ ] fever [ ] chills  [ ] weight loss  [ ] weakness  Skin:  [ ] rash [ ] phlebitis	  Eyes: [ ] icterus [ ] pain  [ ] discharge	  ENMT: [ ] sore throat  [ ] thrush [ ] ulcers [ ] exudates  Respiratory: [ ] dyspnea [ ] hemoptysis [x ] cough [ ] sputum	  Cardiovascular:  [ ] chest pain [ ] palpitations [ ] edema	  Gastrointestinal:  [ ] nausea [ ] vomiting [ ] diarrhea [ ] constipation [ ] pain	  Genitourinary:  [ ] dysuria [ ] frequency [ ] hematuria [ ] discharge [ ] flank pain  [ ] incontinence  Musculoskeletal:  [ ] myalgias [ ] arthralgias [ ] arthritis  [ ] back pain  Neurological:  [x ] headache [ ] seizures  [ ] confusion/altered mental status  Psychiatric:  [ ] anxiety [ ] depression	  Hematology/Lymphatics:  [ ] lymphadenopathy  Endocrine:  [ ] adrenal [ ] thyroid  Allergic/Immunologic:	 [ ] transplant [ ] seasonal    Vital Signs Last 24 Hrs  T(F): 97.5 (20 @ 05:21), Max: 100.4 (20 @ 13:22)  Vital Signs Last 24 Hrs  HR: 75 (20 @ 05:21) (75 - 99)  BP: 123/89 (20 @ 05:21) (117/79 - 123/89)  RR: 18 (20 @ 05:21)  SpO2: 99% (20 @ 05:21) (96% - 99%)  Wt(kg): --    PHYSICAL EXAM:  Constitutional: non-toxic, stuttering and struggling to form works  HEAD/EYES: anicteric, no conjunctival injection  ENT:  supple, no thrush  Cardiovascular:   normal S1, S2, no murmur, no edema  Respiratory:  clear BS bilaterally, no wheezes, no rales  GI:  soft, non-tender, normal bowel sounds  :  no márquez,   Musculoskeletal:  no synovitis, normal ROM  Neurologic: mild left eye photophobia, no nuchal rigidity, LUE motor strength 4/5, LLE 4-5/5  Skin:  no rash, no erythema, no phlebitis  Heme/Onc: no lymphadenopathy   Psychiatric:  awake, alert, appropriate mood                            12.6   5.86  )-----------( 287      ( 2020 07:05 )             40.4   11-06    139  |  106  |  8   ----------------------------<  84  3.8   |  22  |  0.60    Ca    9.0      2020 07:04  Phos  3.3     11-  Mg     2.1     11-06    TPro  6.2  /  Alb  3.7  /  TBili  0.3  /  DBili  x   /  AST  17  /  ALT  14  /  AlkPhos  83  11-05    Urinalysis Basic - ( 2020 14:06 )    Color: Colorless / Appearance: Clear / S.033 / pH: x  Gluc: x / Ketone: Negative  / Bili: Negative / Urobili: Negative   Blood: x / Protein: Negative / Nitrite: Negative   Leuk Esterase: Negative / RBC: 1 /hpf / WBC 1 /HPF   Sq Epi: x / Non Sq Epi: 2 /hpf / Bacteria: Negative    MICROBIOLOGY:  Culture - Blood (collected 2020 18:31)  Source: .Blood Blood  Preliminary Report (2020 19:01):    No growth to date.    Culture - Blood (collected 2020 18:31)  Source: .Blood Blood  Preliminary Report (2020 19:01):    No growth to date.    Culture - Urine (collected 2020 17:50)  Source: .Urine Clean Catch (Midstream)  Final Report (2020 22:09):    <10,000 CFU/mL Normal Urogenital Libby    Culture - CSF with Gram Stain (collected 2020 17:41)  Source: .CSF CSF  Gram Stain (2020 18:38):    No polymorphonuclear cells seen    No organisms seen    by cytocentrifuge  Preliminary Report (2020 20:44):    No growth              HSV 1/2 PCR: Cristi ( @ 02:40)      RADIOLOGY:  < from: MR Head w/wo IV Cont (20 @ 09:38) >    IMPRESSION:  Nonspecific focus of T2/FLAIR signal hyperintensity within the left parietal lobe on the images referenced above. This may represent a focus of infection inflammation demyelination or ischemia. Clinical correlation recommended.    < from: CT Angio Head w/ IV Cont (20 @ 14:39) >  IMPRESSION:    CT brain:  No hydrocephalus, acute intracranial hemorrhage, mass effect, or brain edema.  No displaced calvarial fracture.    CTA neck:  No flow-limiting stenosis or evidence for arterial dissection.    CTA brain:  No flow-limiting stenosis, vascular aneurysm, or AVM.
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
## 2020-11-07 LAB
ANION GAP SERPL CALC-SCNC: 11 MMOL/L — SIGNIFICANT CHANGE UP (ref 5–17)
BUN SERPL-MCNC: 9 MG/DL — SIGNIFICANT CHANGE UP (ref 7–23)
CALCIUM SERPL-MCNC: 9.4 MG/DL — SIGNIFICANT CHANGE UP (ref 8.4–10.5)
CHLORIDE SERPL-SCNC: 105 MMOL/L — SIGNIFICANT CHANGE UP (ref 96–108)
CHOLEST SERPL-MCNC: 132 MG/DL — SIGNIFICANT CHANGE UP
CO2 SERPL-SCNC: 22 MMOL/L — SIGNIFICANT CHANGE UP (ref 22–31)
CREAT SERPL-MCNC: 0.56 MG/DL — SIGNIFICANT CHANGE UP (ref 0.5–1.3)
CSF PCR RESULT: SIGNIFICANT CHANGE UP
CULTURE RESULTS: SIGNIFICANT CHANGE UP
ERYTHROCYTE [SEDIMENTATION RATE] IN BLOOD: 28 MM/HR — HIGH (ref 0–15)
GLUCOSE SERPL-MCNC: 80 MG/DL — SIGNIFICANT CHANGE UP (ref 70–99)
HCT VFR BLD CALC: 40.3 % — SIGNIFICANT CHANGE UP (ref 34.5–45)
HDLC SERPL-MCNC: 43 MG/DL — LOW
HGB BLD-MCNC: 12.9 G/DL — SIGNIFICANT CHANGE UP (ref 11.5–15.5)
LIPID PNL WITH DIRECT LDL SERPL: 68 MG/DL — SIGNIFICANT CHANGE UP
MAGNESIUM SERPL-MCNC: 2.1 MG/DL — SIGNIFICANT CHANGE UP (ref 1.6–2.6)
MCHC RBC-ENTMCNC: 28.5 PG — SIGNIFICANT CHANGE UP (ref 27–34)
MCHC RBC-ENTMCNC: 32 GM/DL — SIGNIFICANT CHANGE UP (ref 32–36)
MCV RBC AUTO: 89 FL — SIGNIFICANT CHANGE UP (ref 80–100)
NMDAR IGG TITR CSF IF: SIGNIFICANT CHANGE UP
NON HDL CHOLESTEROL: 89 MG/DL — SIGNIFICANT CHANGE UP
NRBC # BLD: 0 /100 WBCS — SIGNIFICANT CHANGE UP (ref 0–0)
PHOSPHATE SERPL-MCNC: 3.4 MG/DL — SIGNIFICANT CHANGE UP (ref 2.5–4.5)
PLATELET # BLD AUTO: 288 K/UL — SIGNIFICANT CHANGE UP (ref 150–400)
POTASSIUM SERPL-MCNC: 3.8 MMOL/L — SIGNIFICANT CHANGE UP (ref 3.5–5.3)
POTASSIUM SERPL-SCNC: 3.8 MMOL/L — SIGNIFICANT CHANGE UP (ref 3.5–5.3)
RBC # BLD: 4.53 M/UL — SIGNIFICANT CHANGE UP (ref 3.8–5.2)
RBC # FLD: 12.9 % — SIGNIFICANT CHANGE UP (ref 10.3–14.5)
SODIUM SERPL-SCNC: 138 MMOL/L — SIGNIFICANT CHANGE UP (ref 135–145)
SPECIMEN SOURCE: SIGNIFICANT CHANGE UP
TRIGL SERPL-MCNC: 105 MG/DL — SIGNIFICANT CHANGE UP
VDRL CSF-TITR: NEGATIVE — SIGNIFICANT CHANGE UP
WBC # BLD: 6.24 K/UL — SIGNIFICANT CHANGE UP (ref 3.8–10.5)
WBC # FLD AUTO: 6.24 K/UL — SIGNIFICANT CHANGE UP (ref 3.8–10.5)

## 2020-11-07 PROCEDURE — 99233 SBSQ HOSP IP/OBS HIGH 50: CPT | Mod: GC

## 2020-11-07 RX ORDER — KETOROLAC TROMETHAMINE 30 MG/ML
15 SYRINGE (ML) INJECTION ONCE
Refills: 0 | Status: DISCONTINUED | OUTPATIENT
Start: 2020-11-07 | End: 2020-11-07

## 2020-11-07 RX ORDER — KETOROLAC TROMETHAMINE 30 MG/ML
15 SYRINGE (ML) INJECTION EVERY 6 HOURS
Refills: 0 | Status: DISCONTINUED | OUTPATIENT
Start: 2020-11-07 | End: 2020-11-08

## 2020-11-07 RX ORDER — ONDANSETRON 8 MG/1
4 TABLET, FILM COATED ORAL ONCE
Refills: 0 | Status: COMPLETED | OUTPATIENT
Start: 2020-11-07 | End: 2020-11-07

## 2020-11-07 RX ORDER — SUMATRIPTAN SUCCINATE 4 MG/.5ML
100 INJECTION, SOLUTION SUBCUTANEOUS
Refills: 0 | Status: DISCONTINUED | OUTPATIENT
Start: 2020-11-07 | End: 2020-11-08

## 2020-11-07 RX ORDER — ACETAMINOPHEN 500 MG
650 TABLET ORAL EVERY 6 HOURS
Refills: 0 | Status: DISCONTINUED | OUTPATIENT
Start: 2020-11-07 | End: 2020-11-10

## 2020-11-07 RX ADMIN — SUMATRIPTAN SUCCINATE 100 MILLIGRAM(S): 4 INJECTION, SOLUTION SUBCUTANEOUS at 19:34

## 2020-11-07 RX ADMIN — Medication 15 MILLIGRAM(S): at 17:30

## 2020-11-07 RX ADMIN — Medication 15 MILLIGRAM(S): at 17:15

## 2020-11-07 RX ADMIN — Medication 15 MILLIGRAM(S): at 09:49

## 2020-11-07 RX ADMIN — Medication 15 MILLIGRAM(S): at 23:05

## 2020-11-07 RX ADMIN — SUMATRIPTAN SUCCINATE 100 MILLIGRAM(S): 4 INJECTION, SOLUTION SUBCUTANEOUS at 21:16

## 2020-11-07 RX ADMIN — Medication 15 MILLIGRAM(S): at 10:05

## 2020-11-07 RX ADMIN — Medication 0.5 MILLIGRAM(S): at 22:38

## 2020-11-07 RX ADMIN — Medication 0.5 MILLIGRAM(S): at 05:10

## 2020-11-07 RX ADMIN — ONDANSETRON 4 MILLIGRAM(S): 8 TABLET, FILM COATED ORAL at 21:24

## 2020-11-07 RX ADMIN — Medication 15 MILLIGRAM(S): at 23:55

## 2020-11-07 NOTE — PROGRESS NOTE ADULT - PROBLEM SELECTOR PLAN 5
# Fall/LOC  seizure vs cardiac vs hypotension/orthostasis; hx does not sound mechanical  -r/o sz as above  -tropes wnl  - ECG wnl

## 2020-11-07 NOTE — PROGRESS NOTE ADULT - PROBLEM SELECTOR PLAN 1
#New onset stutter and tremor  MRI brain 11/5 Nonspecific focus of T2/FLAIR signal hyperintensity within the left parietal lobe on the images referenced above. This may represent a focus of infection inflammation demyelination or ischemia.As per d/w neuro on 11/6 can be seen in migraine. Does not appear to be meningitis as LP unrevealing, HSV negative, Syphilis negative.  -DC acyclovir  -appreciate neuro c/s in ED - feels not to be a seizure or neurological in origin  -MRI C-spine, t-spine, c-spine, w/wo con, given sx/presentation and previous brain findings  -EEG.  -pt to work on obtaining CD from OSH for comparison  -speech eval for stutter  -Psych consult for poss conversion disorder #New onset stutter and tremor  MRI brain 11/5 Nonspecific focus of T2/FLAIR signal hyperintensity within the left parietal lobe on the images referenced above. This may represent a focus of infection inflammation demyelination or ischemia.As per d/w neuro on 11/6 can be seen in migraine. Does not appear to be meningitis as LP unrevealing, HSV negative, Syphilis negative.  -DC acyclovir  -appreciate neuro c/s in ED - feels not to be a seizure or neurological in origin  -MRI C-spine, t-spine, c-spine, w/wo con, given sx/presentation and previous brain findings  -EEG.  -pt to work on obtaining CD from OSH for comparison  -speech eval for stutter, will need prescription on discharge for outpatient speech therapy   -Psych consult for poss conversion disorder

## 2020-11-07 NOTE — SPEECH LANGUAGE PATHOLOGY EVALUATION - COMMENTS
MD Kimura informed of recommendations for outpatient speech therapy pt effectively communicating via writing; demonstrated adequate understanding of complex topics pt unable to effectively communicate 2/2 severity of dysfluency  intermittently able to produce single words after much effort grossly intact; pt effectively communicating via writing; demonstrated adequate understanding of complex topics WNL As per d/w neuro on 11/6 can be seen in migraine. Does not appear to be meningitis as LP unrevealing, HSV negative, Syphilis negative. As per ID - Hx of Latent TB; s/p 6 months of treatment per patient 2018 after positive quantiferon, unknown agent, presumably INH. No evidence of infectious process. As per behavioral health assessment “Pt does not demonstrate signs of underlying psychiatric disorder.”

## 2020-11-07 NOTE — SWALLOW BEDSIDE ASSESSMENT ADULT - COMMENTS
As per d/w neuro on 11/6 can be seen in migraine. Does not appear to be meningitis as LP unrevealing, HSV negative, Syphilis negative. As per ID - Hx of Latent TB; s/p 6 months of treatment per patient 2018 after positive quantiferon, unknown agent, presumably INH. No evidence of infectious process. As per behavioral health assessment “Pt does not demonstrate signs of underlying psychiatric disorder.”

## 2020-11-07 NOTE — SWALLOW BEDSIDE ASSESSMENT ADULT - SWALLOW EVAL: DIAGNOSIS
Patient presents with an overtly functional pharyngeal swallow sequence. Oral phase of the swallow is characterized by prolonged, mildly effortful mastication which appears to be related to pt c/o mandibular pain. Patient admitted with sudden onset stutter and R hand tremor. PMHx notable for migraines and ?pituitary tumor per OSH. Patient presents with an overtly functional pharyngeal swallow sequence. Oral phase of the swallow is characterized by prolonged, mildly effortful mastication which appears to be related to pt c/o mandibular pain.

## 2020-11-07 NOTE — PROGRESS NOTE ADULT - PROBLEM SELECTOR PLAN 2
#LLE LUE weakness w/ numbness ; now weakness improved and no longer with numbness  as above  decremental decrease in strength on exam - has good strength initially but with continued exertion fatigues.

## 2020-11-07 NOTE — SPEECH LANGUAGE PATHOLOGY EVALUATION - SLP PERTINENT HISTORY OF CURRENT PROBLEM
23 yo F w/ hx of migraine (on Amitriptyline), pituitary lesion and nonspecific cerebral white matter lesions  seen on MRI Head at [facility] in 08/2020 ,who presents as a code stroke for sudden onset stutter and R hand tremor. On adm w/ fever Tmax 100.4 x1, no leukocytosis or electrolyte abnlties. CTH and CTA wnl in ED; LP unrevealing. Ddx 2/2 concussion (but pt denying hitting head) vs. seizure (post ictal) vs meningitis (one time fever, no leukocytosis) vs MS vs other structural CNS lesion vs mood/psych (though less likely as pt denying emotional sx appears euthymic, organized, pleasant). MRI brain showing nonspecific focus of T2/flair signal hyperintensity within the L parietal lobe  which could be infection vs demylination vs ischemia.

## 2020-11-07 NOTE — PROGRESS NOTE ADULT - PROBLEM SELECTOR PLAN 3
MRI at [facility] in 08/2020:  1. 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   2. Non specific cerebral white matter lesions unchanged. Small vessel ischemic disease is not likely at this age unless there is an underlying vasculitis/vasculopathy. The findings are not suggestive of demyelinating disease"   "Small areas of signal abnormality in subcortical parietal regions".    As above

## 2020-11-07 NOTE — SWALLOW BEDSIDE ASSESSMENT ADULT - SWALLOW EVAL: PATIENT/FAMILY GOALS STATEMENT
Pt/mother denied hx of dysphagia. Endorsed mandibular pain since admission. Discussed possibility of pain being related to facial tension during attempts to speak given new onset stuttering. Patient endorsed poor PO intake of mechanical soft texture 2/2 dislike. Pt also c/o pain in ears similar to "being on an airplane." Mother requesting f/u discussion with neurosurgery attending MD. Medicine team and RN informed of pt/mother's complaints. Pt/mother denied hx of dysphagia. Mother speaks Samoan - clinician spoke in Samoan to mother throughout interview. Endorsed mandibular pain since admission. Discussed possibility of pain being related to facial tension during attempts to speak given new onset stuttering. Patient endorsed poor PO intake of mechanical soft texture 2/2 dislike. Pt also c/o pain in ears similar to "being on an airplane." Mother requesting f/u discussion with neurosurgery attending MD. Medicine team and RN informed of pt/mother's complaints.

## 2020-11-07 NOTE — SPEECH LANGUAGE PATHOLOGY EVALUATION - SLP GENERAL OBSERVATIONS
Patient encountered awake and alert, upright in bed, on RA, A&Ox4. Communicating effectively via writing. Patient encountered awake and alert, upright in bed, on RA, A&Ox4. Communicating effectively via writing. Mother speaks French - clinician spoke in French to mother throughout interview.

## 2020-11-07 NOTE — PROGRESS NOTE ADULT - ASSESSMENT
25 yo F w/ hx of migraine (on Amitriptyline), pituitary lesion and nonspecific cerebral white matter lesions  seen on MRI Head at [facility] in 08/2020 ,who presents as a code stroke for sudden onset stutter and R hand tremor. On adm w/ fever Tmax 100.4 x1, no leukocytosis or electrolyte abnlties. CTH and CTA wnl in ED;  LP unrevealing. MRI brain showing nonspecific focus of T2/flair signal hyperintensity within the L parietal  lobe  which could be infection vs demylination vs ischemia.        Ddx 2/2 multiple sclerosis/ other dymyelinating dz vs. concussion (but pt denying hitting head) vs. seizure (post ictal)  vs other structural CNS lesion vs conversion disorder  vs vasculitis vs mood/psych (though less likely as pt denying emotional sx appears euthymic, organized, pleasant)

## 2020-11-07 NOTE — PROGRESS NOTE ADULT - SUBJECTIVE AND OBJECTIVE BOX
PROGRESS NOTE:   Authored by Nikki Kimura, MD, Pager [phone] [facility], [address] [facility]     Patient is a 24y old  Female who presents with a chief complaint of new onset stutter and R hand tremor (06 Nov 2020 10:06)      SUBJECTIVE / OVERNIGHT EVENTS:    ADDITIONAL REVIEW OF SYSTEMS:    MEDICATIONS  (STANDING):  LORazepam     Tablet 0.5 milliGRAM(s) Oral two times a day  sodium chloride 0.9%. 1000 milliLiter(s) (100 mL/Hr) IV Continuous <Continuous>    MEDICATIONS  (PRN):  SUMAtriptan 100 milliGRAM(s) Oral every 12 hours PRN migraine headache      CAPILLARY BLOOD GLUCOSE        I&O's Summary    06 Nov 2020 07:01  -  07 Nov 2020 07:00  --------------------------------------------------------  IN: 1120 mL / OUT: 750 mL / NET: 370 mL        PHYSICAL EXAM:  Vital Signs Last 24 Hrs  T(C): 36.4 (07 Nov 2020 04:46), Max: 37 (06 Nov 2020 12:07)  T(F): 97.6 (07 Nov 2020 04:46), Max: 98.6 (06 Nov 2020 12:07)  HR: 96 (07 Nov 2020 04:46) (85 - 96)  BP: 114/80 (07 Nov 2020 04:46) (103/70 - 117/78)  BP(mean): --  RR: 17 (07 Nov 2020 04:46) (17 - 18)  SpO2: 96% (07 Nov 2020 04:46) (96% - 99%)    CONSTITUTIONAL: NAD, well-developed  RESPIRATORY: Normal respiratory effort; lungs are clear to auscultation bilaterally  CARDIOVASCULAR: Regular rate and rhythm, normal S1 and S2, no murmur/rub/gallop; No lower extremity edema; Peripheral pulses are 2+ bilaterally  ABDOMEN: Nontender to palpation, normoactive bowel sounds, no rebound/guarding; No hepatosplenomegaly  MUSCULOSKELETAL: no clubbing or cyanosis of digits; no joint swelling or tenderness to palpation  PSYCH: A+O to person, place, and time; affect appropriate    LABS:                        12.9   6.24  )-----------( 288      ( 07 Nov 2020 07:37 )             40.3     11-07    138  |  105  |  9   ----------------------------<  80  3.8   |  22  |  0.56    Ca    9.4      07 Nov 2020 07:37  Phos  3.4     11-07  Mg     2.1     11-07                Culture - Blood (collected 04 Nov 2020 18:31)  Source: .Blood Blood  Preliminary Report (05 Nov 2020 19:01):    No growth to date.    Culture - Blood (collected 04 Nov 2020 18:31)  Source: .Blood Blood  Preliminary Report (05 Nov 2020 19:01):    No growth to date.    Culture - Urine (collected 04 Nov 2020 17:50)  Source: .Urine Clean Catch (Midstream)  Final Report (05 Nov 2020 22:09):    <10,000 CFU/mL Normal Urogenital Libby    Culture - CSF with Gram Stain (collected 04 Nov 2020 17:41)  Source: .CSF CSF  Gram Stain (04 Nov 2020 18:38):    No polymorphonuclear cells seen    No organisms seen    by cytocentrifuge  Preliminary Report (05 Nov 2020 20:44):    No growth        RADIOLOGY & ADDITIONAL TESTS:  Results Reviewed:   Imaging Personally Reviewed:  Electrocardiogram Personally Reviewed:    COORDINATION OF CARE:  Care Discussed with Consultants/Other Providers [Y/N]:  Prior or Outpatient Records Reviewed [Y/N]:   PROGRESS NOTE:   Authored by Nikki Kimura, MD, Pager [phone] [facility], [address] [facility]     Patient is a 24y old  Female who presents with a chief complaint of new onset stutter and R hand tremor (06 Nov 2020 10:06)      SUBJECTIVE / OVERNIGHT EVENTS: Overnight pt complained of continued migraine with left sided arm and leg weakness, also with leg numbness. She continues to have severe stutter, interview done with mom at bedside via  Kate ID [id number]. Mom is extremely distraught over pt's condition. Was given trial of Ativan yesterday but pt states only made her sleepy, did not help with pain. So far nothing has really helped. Pt otherwise denies fever, chills, nausea, or vomiting.     ADDITIONAL REVIEW OF SYSTEMS:    MEDICATIONS  (STANDING):  LORazepam     Tablet 0.5 milliGRAM(s) Oral two times a day  sodium chloride 0.9%. 1000 milliLiter(s) (100 mL/Hr) IV Continuous <Continuous>    MEDICATIONS  (PRN):  SUMAtriptan 100 milliGRAM(s) Oral every 12 hours PRN migraine headache      CAPILLARY BLOOD GLUCOSE        I&O's Summary    06 Nov 2020 07:01  -  07 Nov 2020 07:00  --------------------------------------------------------  IN: 1120 mL / OUT: 750 mL / NET: 370 mL        PHYSICAL EXAM:  Vital Signs Last 24 Hrs  T(C): 36.4 (07 Nov 2020 04:46), Max: 37 (06 Nov 2020 12:07)  T(F): 97.6 (07 Nov 2020 04:46), Max: 98.6 (06 Nov 2020 12:07)  HR: 96 (07 Nov 2020 04:46) (85 - 96)  BP: 114/80 (07 Nov 2020 04:46) (103/70 - 117/78)  BP(mean): --  RR: 17 (07 Nov 2020 04:46) (17 - 18)  SpO2: 96% (07 Nov 2020 04:46) (96% - 99%)    CONSTITUTIONAL: obese young woman lying in bed   RESPIRATORY: Normal respiratory effort; lungs are clear to auscultation bilaterally  CARDIOVASCULAR: Regular rate and rhythm, normal S1 and S2, no murmur/rub/gallop; No lower extremity edema; Peripheral pulses are 2+ bilaterally  ABDOMEN: Nontender to palpation, normoactive bowel sounds, no rebound/guarding; No hepatosplenomegaly  MUSCLOSKELETAL: no clubbing or cyanosis of digits; no joint swelling or tenderness to palpation  PSYCH: A+O to person, place, and time; affect appropriate  NERVOUS SYSTEM:  Alert & Oriented X3, Good concentration; + stutter +no tremor noted  visual fields full, EOMI w/ some ?nystagmus on leftward gaze, pupils reactive to light but dilate again while light shining, decreased CNV on L, symmetric smile and eyebrow raise, asymmetric uvula (but appears Leftward pointing without palate elevation), decreased CN VIII on left, tongue symmetric  Strength: left arm and leg weakness 1/5   Sensory: decreased sensation of LUE, and LLE  Cerebellar: no dysmetria on FNF, no dysdiadokinesia  PSYCH: aaox3, appropriate       LABS:                        12.9   6.24  )-----------( 288      ( 07 Nov 2020 07:37 )             40.3     11-07    138  |  105  |  9   ----------------------------<  80  3.8   |  22  |  0.56    Ca    9.4      07 Nov 2020 07:37  Phos  3.4     11-07  Mg     2.1     11-07                Culture - Blood (collected 04 Nov 2020 18:31)  Source: .Blood Blood  Preliminary Report (05 Nov 2020 19:01):    No growth to date.    Culture - Blood (collected 04 Nov 2020 18:31)  Source: .Blood Blood  Preliminary Report (05 Nov 2020 19:01):    No growth to date.    Culture - Urine (collected 04 Nov 2020 17:50)  Source: .Urine Clean Catch (Midstream)  Final Report (05 Nov 2020 22:09):    <10,000 CFU/mL Normal Urogenital Libby    Culture - CSF with Gram Stain (collected 04 Nov 2020 17:41)  Source: .CSF CSF  Gram Stain (04 Nov 2020 18:38):    No polymorphonuclear cells seen    No organisms seen    by cytocentrifuge  Preliminary Report (05 Nov 2020 20:44):    No growth        RADIOLOGY & ADDITIONAL TESTS:  Results Reviewed:   Imaging Personally Reviewed:  Electrocardiogram Personally Reviewed:    COORDINATION OF CARE:  Care Discussed with Consultants/Other Providers [Y/N]:  Prior or Outpatient Records Reviewed [Y/N]:

## 2020-11-07 NOTE — SWALLOW BEDSIDE ASSESSMENT ADULT - SLP PERTINENT HISTORY OF CURRENT PROBLEM
25 yo F w/ hx of migraine (on Amitriptyline), pituitary lesion and nonspecific cerebral white matter lesions  seen on MRI Head at [facility] in 08/2020 ,who presents as a code stroke for sudden onset stutter and R hand tremor. On adm w/ fever Tmax 100.4 x1, no leukocytosis or electrolyte abnlties. CTH and CTA wnl in ED; LP unrevealing. Ddx 2/2 concussion (but pt denying hitting head) vs. seizure (post ictal) vs meningitis (one time fever, no leukocytosis) vs MS vs other structural CNS lesion vs mood/psych (though less likely as pt denying emotional sx appears euthymic, organized, pleasant). MRI brain showing nonspecific focus of T2/flair signal hyperintensity within the L parietal lobe  which could be infection vs demylination vs ischemia.

## 2020-11-07 NOTE — SWALLOW BEDSIDE ASSESSMENT ADULT - SLP GENERAL OBSERVATIONS
Patient encountered awake and alert, upright in bed, on RA, A&Ox4. +Stutter with poor ability to communicate wants/needs verbally. Able to communicate effectively via writing. Able to follow commands.

## 2020-11-07 NOTE — PROGRESS NOTE ADULT - PROBLEM SELECTOR PLAN 4
-currently with HA which feels different than usual migraine  -hold  home amitryptyline 10mg qd. -currently with HA which feels different than usual migraine  -hold  home amitryptyline 10mg qd.  -toradol standing for 2 days

## 2020-11-07 NOTE — SPEECH LANGUAGE PATHOLOGY EVALUATION - SLP DIAGNOSIS
Patient presents with severe dysfluency characterized by frequent blocks during short speech tasks, mono-syllabic repetition, facial tension during attempts to speak, and prolonged holding of vowel sounds with inability to terminate volitionally. Easy onset and breathing exercises ineffective in improving fluency. Comprehension and cognition appear grossly intact. Patient admitted with sudden onset stutter and R hand tremor. PMHx notable for migraines and ?pituitary tumor per OSH. Patient presents with severe dysfluency characterized by frequent blocks during short speech tasks, mono-syllabic repetition, facial tension during attempts to speak, and prolonged holding of vowel sounds with inability to terminate volitionally. Easy onset and breathing exercises ineffective in improving fluency. Comprehension and cognition appear grossly intact.

## 2020-11-08 DIAGNOSIS — F80.81 CHILDHOOD ONSET FLUENCY DISORDER: ICD-10-CM

## 2020-11-08 LAB
ANION GAP SERPL CALC-SCNC: 10 MMOL/L — SIGNIFICANT CHANGE UP (ref 5–17)
BUN SERPL-MCNC: 10 MG/DL — SIGNIFICANT CHANGE UP (ref 7–23)
CALCIUM SERPL-MCNC: 9.2 MG/DL — SIGNIFICANT CHANGE UP (ref 8.4–10.5)
CHLORIDE SERPL-SCNC: 106 MMOL/L — SIGNIFICANT CHANGE UP (ref 96–108)
CO2 SERPL-SCNC: 23 MMOL/L — SIGNIFICANT CHANGE UP (ref 22–31)
CREAT SERPL-MCNC: 0.66 MG/DL — SIGNIFICANT CHANGE UP (ref 0.5–1.3)
GLUCOSE SERPL-MCNC: 91 MG/DL — SIGNIFICANT CHANGE UP (ref 70–99)
HCT VFR BLD CALC: 41.1 % — SIGNIFICANT CHANGE UP (ref 34.5–45)
HGB BLD-MCNC: 13.2 G/DL — SIGNIFICANT CHANGE UP (ref 11.5–15.5)
MAGNESIUM SERPL-MCNC: 2 MG/DL — SIGNIFICANT CHANGE UP (ref 1.6–2.6)
MCHC RBC-ENTMCNC: 28.1 PG — SIGNIFICANT CHANGE UP (ref 27–34)
MCHC RBC-ENTMCNC: 32.1 GM/DL — SIGNIFICANT CHANGE UP (ref 32–36)
MCV RBC AUTO: 87.4 FL — SIGNIFICANT CHANGE UP (ref 80–100)
NRBC # BLD: 0 /100 WBCS — SIGNIFICANT CHANGE UP (ref 0–0)
PHOSPHATE SERPL-MCNC: 3.6 MG/DL — SIGNIFICANT CHANGE UP (ref 2.5–4.5)
PLATELET # BLD AUTO: 299 K/UL — SIGNIFICANT CHANGE UP (ref 150–400)
POTASSIUM SERPL-MCNC: 4 MMOL/L — SIGNIFICANT CHANGE UP (ref 3.5–5.3)
POTASSIUM SERPL-SCNC: 4 MMOL/L — SIGNIFICANT CHANGE UP (ref 3.5–5.3)
RBC # BLD: 4.7 M/UL — SIGNIFICANT CHANGE UP (ref 3.8–5.2)
RBC # FLD: 12.8 % — SIGNIFICANT CHANGE UP (ref 10.3–14.5)
SODIUM SERPL-SCNC: 139 MMOL/L — SIGNIFICANT CHANGE UP (ref 135–145)
WBC # BLD: 6.58 K/UL — SIGNIFICANT CHANGE UP (ref 3.8–10.5)
WBC # FLD AUTO: 6.58 K/UL — SIGNIFICANT CHANGE UP (ref 3.8–10.5)

## 2020-11-08 PROCEDURE — 99233 SBSQ HOSP IP/OBS HIGH 50: CPT | Mod: GC

## 2020-11-08 PROCEDURE — 76390 MR SPECTROSCOPY: CPT | Mod: 26

## 2020-11-08 RX ORDER — VERAPAMIL HCL 240 MG
120 CAPSULE, EXTENDED RELEASE PELLETS 24 HR ORAL DAILY
Refills: 0 | Status: DISCONTINUED | OUTPATIENT
Start: 2020-11-08 | End: 2020-11-08

## 2020-11-08 RX ORDER — LIDOCAINE 4 G/100G
2 CREAM TOPICAL DAILY
Refills: 0 | Status: DISCONTINUED | OUTPATIENT
Start: 2020-11-08 | End: 2020-11-10

## 2020-11-08 RX ORDER — VERAPAMIL HCL 240 MG
120 CAPSULE, EXTENDED RELEASE PELLETS 24 HR ORAL DAILY
Refills: 0 | Status: DISCONTINUED | OUTPATIENT
Start: 2020-11-08 | End: 2020-11-10

## 2020-11-08 RX ORDER — ONDANSETRON 8 MG/1
4 TABLET, FILM COATED ORAL EVERY 6 HOURS
Refills: 0 | Status: DISCONTINUED | OUTPATIENT
Start: 2020-11-08 | End: 2020-11-10

## 2020-11-08 RX ADMIN — Medication 120 MILLIGRAM(S): at 16:54

## 2020-11-08 RX ADMIN — SUMATRIPTAN SUCCINATE 100 MILLIGRAM(S): 4 INJECTION, SOLUTION SUBCUTANEOUS at 05:43

## 2020-11-08 RX ADMIN — Medication 50 MILLIGRAM(S): at 13:08

## 2020-11-08 RX ADMIN — SUMATRIPTAN SUCCINATE 100 MILLIGRAM(S): 4 INJECTION, SOLUTION SUBCUTANEOUS at 06:00

## 2020-11-08 RX ADMIN — ONDANSETRON 4 MILLIGRAM(S): 8 TABLET, FILM COATED ORAL at 21:56

## 2020-11-08 RX ADMIN — Medication 15 MILLIGRAM(S): at 05:44

## 2020-11-08 RX ADMIN — Medication 15 MILLIGRAM(S): at 05:59

## 2020-11-08 RX ADMIN — LIDOCAINE 2 PATCH: 4 CREAM TOPICAL at 13:07

## 2020-11-08 RX ADMIN — LIDOCAINE 2 PATCH: 4 CREAM TOPICAL at 19:17

## 2020-11-08 RX ADMIN — Medication 0.5 MILLIGRAM(S): at 05:44

## 2020-11-08 NOTE — PROGRESS NOTE ADULT - PROBLEM SELECTOR PLAN 4
-currently with HA which feels different than usual migraine  -hold  home amitryptyline 10mg qd.  -toradol standing for 2 days -currently with HA which feels different than usual migraine  -hold  home amitryptyline 10mg qd.  -toradol standing x1 d- did not help

## 2020-11-08 NOTE — PROGRESS NOTE ADULT - PROBLEM SELECTOR PLAN 2
#LLE LUE weakness w/ numbness ; now weakness improved and no longer with numbness  -intermittently comes back  -may be related to migraine

## 2020-11-08 NOTE — PROGRESS NOTE ADULT - PROBLEM SELECTOR PLAN 1
#New onset stutter and tremor  MRI brain 11/5 Nonspecific focus of T2/FLAIR signal hyperintensity within the left parietal lobe on the images referenced above. This may represent a focus of infection inflammation demyelination or ischemia.As per d/w neuro on 11/6 can be seen in migraine. Does not appear to be meningitis as LP unrevealing, HSV negative, Syphilis negative.  -DC acyclovir  -appreciate neuro c/s in ED - feels not to be a seizure or neurological in origin  -MRI C-spine, t-spine, c-spine, unrevealing  -EEG, excess beta activity - as per neuro this is nonspecific  - CD given to medical records on 11/6 to upload  -speech eval for stutter, will need prescription on discharge for outpatient speech therapy   -Psych consult- unlikely to be psych; s/p trial of ativan without improvement #New onset stutter and tremor  MRI brain 11/5 Nonspecific focus of T2/FLAIR signal hyperintensity within the left parietal lobe on the images referenced above. This may represent a focus of infection inflammation demyelination or ischemia.As per d/w neuro on 11/6 can be seen in migraine. Does not appear to be meningitis as LP unrevealing, HSV negative, Syphilis negative.  -appreciate neuro c/s in ED - feels not to be a seizure or neurological in origin  -MRI C-spine, t-spine, c-spine, unrevealing  -EEG, excess beta activity - as per neuro this is nonspecific  - CD given to medical records on 11/6 to upload  -speech eval for stutter, will need prescription on discharge for outpatient speech therapy   -Psych consult- unlikely to be psych; s/p trial of ativan without improvement- will discontinue as no improvement  -sx may be related migraine:   trial verapamil 120 SR qd (will uptitrate as tolerated), methyprednisolone 50mg IV  BID  -still c/o shoulder pain - trial lidocaine patches  -MR spect to eval parietal lesion further as currently still unclear etiology

## 2020-11-08 NOTE — PROGRESS NOTE ADULT - SUBJECTIVE AND OBJECTIVE BOX
PROGRESS NOTE:     CONTACT INFO:  Laura Quiroga MD | PGY-1  Pager: [phone] [place], [address] [facility]  After 7pm page night float      Patient is a 24y old  Female who presents with a chief complaint of new onset stutter and R hand tremor (07 Nov 2020 09:03)      SUBJECTIVE / OVERNIGHT EVENTS:    - No acute events overnight.   - No fevers/chills.  - Patient seen and evaluated at bedside.  - Denies SOB at rest, chest pain, palpitations.  -Pt provides writer with diagram illustrating symptoms on body: including severe pain on b/l shoulder, L stabbing neck pain, b/l HA with sharp pressure.   Feels sx minimally improved with toradal yesterday.  -Still with stutter, tremor resolved.    ADDITIONAL REVIEW OF SYSTEMS:    MEDICATIONS  (STANDING):  ketorolac   Injectable 15 milliGRAM(s) IV Push every 6 hours  LORazepam     Tablet 0.5 milliGRAM(s) Oral two times a day  sodium chloride 0.9%. 1000 milliLiter(s) (100 mL/Hr) IV Continuous <Continuous>  SUMAtriptan 100 milliGRAM(s) Oral two times a day    MEDICATIONS  (PRN):  acetaminophen   Tablet .. 650 milliGRAM(s) Oral every 6 hours PRN Mild Pain (1 - 3), Moderate Pain (4 - 6)      CAPILLARY BLOOD GLUCOSE        I&O's Summary    07 Nov 2020 07:01  -  08 Nov 2020 07:00  --------------------------------------------------------  IN: 600 mL / OUT: 0 mL / NET: 600 mL        PHYSICAL EXAM:  Vital Signs Last 24 Hrs  T(C): 36.8 (08 Nov 2020 04:55), Max: 36.8 (07 Nov 2020 11:28)  T(F): 98.3 (08 Nov 2020 04:55), Max: 98.3 (07 Nov 2020 20:40)  HR: 86 (08 Nov 2020 04:55) (84 - 87)  BP: 118/79 (08 Nov 2020 04:55) (108/77 - 118/79)  BP(mean): --  RR: 18 (08 Nov 2020 04:55) (17 - 18)  SpO2: 95% (08 Nov 2020 04:55) (95% - 98%)    CONSTITUTIONAL: NAD, well-developed  RESPIRATORY: Normal respiratory effort; lungs are clear to auscultation bilaterally  CARDIOVASCULAR: Regular rate and rhythm, normal S1 and S2, no murmur/rub/gallop; No lower extremity edema; Peripheral pulses are 2+ bilaterally  ABDOMEN: Nontender to palpation, normoactive bowel sounds, no rebound/guarding; No hepatosplenomegaly  MUSCLOSKELETAL: no clubbing or cyanosis of digits; no joint swelling or tenderness to palpation  PSYCH: A+O to person, place, and time; affect appropriate  NERVOUS SYSTEM:  Alert & Oriented X3, Good concentration; + stutter +no tremor noted  visual fields full, EOMI w/ some ?nystagmus on leftward gaze, pupils reactive to light but dilate again while light shining, decreased CNV on L, symmetric smile and eyebrow raise, asymmetric uvula (but appears Leftward pointing without palate elevation), decreased CN VIII on left, tongue symmetric  Strength: b/l UE 5/5 strength improved, no decremental response noted. RLE 5/5, LLE 5/5  Sensory: decreased sensation of LUE, and LLE  Cerebellar: no dysmetria on FNF, no dysdiadokinesia  PSYCH: aaox3, appropriate, pleasant, reporting normal mood normally though currently feeling frustrated d/t stutter; thought process linear, with good content    LABS:                        13.2   6.58  )-----------( 299      ( 08 Nov 2020 06:58 )             41.1     11-08    139  |  106  |  10  ----------------------------<  91  4.0   |  23  |  0.66    Ca    9.2      08 Nov 2020 06:56  Phos  3.6     11-08  Mg     2.0     11-08                  RADIOLOGY & ADDITIONAL TESTS:  Results Reviewed:   Imaging Personally Reviewed:  Electrocardiogram Personally Reviewed:    COORDINATION OF CARE:  Care Discussed with Consultants/Other Providers [Y/N]: Y  Prior or Outpatient Records Reviewed [Y/N]: Y     PROGRESS NOTE:     CONTACT INFO:  Laura Quiroga MD | PGY-1  Pager: [phone] [place], [address] [facility]  After 7pm page night float      Patient is a 24y old  Female who presents with a chief complaint of new onset stutter and R hand tremor (07 Nov 2020 09:03)      SUBJECTIVE / OVERNIGHT EVENTS:    - No acute events overnight.   - No fevers/chills.  - Patient seen and evaluated at bedside.  - Denies SOB at rest, chest pain, palpitations.  -Pt provides writer with diagram illustrating symptoms on body: including severe pain on b/l shoulder, L stabbing neck pain, b/l HA with sharp pressure.   Feels sx minimally improved with toradal yesterday.  -Still with stutter, tremor resolved.  -Updated mother at bedside; [place] Int [id number]. Mother still distraught.  - c/o numbness on soles of feet otherwise no other numbness/tingling.    ADDITIONAL REVIEW OF SYSTEMS:    MEDICATIONS  (STANDING):  ketorolac   Injectable 15 milliGRAM(s) IV Push every 6 hours  LORazepam     Tablet 0.5 milliGRAM(s) Oral two times a day  sodium chloride 0.9%. 1000 milliLiter(s) (100 mL/Hr) IV Continuous <Continuous>  SUMAtriptan 100 milliGRAM(s) Oral two times a day    MEDICATIONS  (PRN):  acetaminophen   Tablet .. 650 milliGRAM(s) Oral every 6 hours PRN Mild Pain (1 - 3), Moderate Pain (4 - 6)      CAPILLARY BLOOD GLUCOSE        I&O's Summary    07 Nov 2020 07:01  -  08 Nov 2020 07:00  --------------------------------------------------------  IN: 600 mL / OUT: 0 mL / NET: 600 mL        PHYSICAL EXAM:  Vital Signs Last 24 Hrs  T(C): 36.8 (08 Nov 2020 04:55), Max: 36.8 (07 Nov 2020 11:28)  T(F): 98.3 (08 Nov 2020 04:55), Max: 98.3 (07 Nov 2020 20:40)  HR: 86 (08 Nov 2020 04:55) (84 - 87)  BP: 118/79 (08 Nov 2020 04:55) (108/77 - 118/79)  BP(mean): --  RR: 18 (08 Nov 2020 04:55) (17 - 18)  SpO2: 95% (08 Nov 2020 04:55) (95% - 98%)    CONSTITUTIONAL: NAD, well-developed  RESPIRATORY: Normal respiratory effort; lungs are clear to auscultation bilaterally  CARDIOVASCULAR: Regular rate and rhythm, normal S1 and S2, no murmur/rub/gallop; No lower extremity edema; Peripheral pulses are 2+ bilaterally  ABDOMEN: Nontender to palpation, normoactive bowel sounds, no rebound/guarding; No hepatosplenomegaly  MUSCLOSKELETAL: no clubbing or cyanosis of digits; no joint swelling or tenderness to palpation  PSYCH: A+O to person, place, and time; affect appropriate  NERVOUS SYSTEM:  Alert & Oriented X3, Good concentration; + stutter +no tremor noted  CN II-VII intact  Strength: LUE 4/5 otherwise 5/5 throughout  Sensory: intact sensation except some numbness on soles  Cerebellar: no dysmetria on FNF, no dysdiadokinesia, no abnlty on heel shin  PSYCH: aaox3, appropriate, pleasant, reporting normal mood normally though currently feeling frustrated d/t stutter; thought process linear, with good content    LABS:                        13.2   6.58  )-----------( 299      ( 08 Nov 2020 06:58 )             41.1     11-08    139  |  106  |  10  ----------------------------<  91  4.0   |  23  |  0.66    Ca    9.2      08 Nov 2020 06:56  Phos  3.6     11-08  Mg     2.0     11-08                  RADIOLOGY & ADDITIONAL TESTS:  Results Reviewed:   Imaging Personally Reviewed:  Electrocardiogram Personally Reviewed:    COORDINATION OF CARE:  Care Discussed with Consultants/Other Providers [Y/N]: Y  Prior or Outpatient Records Reviewed [Y/N]: Y

## 2020-11-08 NOTE — PROGRESS NOTE ADULT - PROBLEM SELECTOR PLAN 3
MRI at [facility] in 08/2020:  1. 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   2. Non specific cerebral white matter lesions unchanged. Small vessel ischemic disease is not likely at this age unless there is an underlying vasculitis/vasculopathy. The findings are not suggestive of demyelinating disease"   "Small areas of signal abnormality in subcortical parietal regions".    As above MRI at [facility] in 08/2020:  1. 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   2. Non specific cerebral white matter lesions unchanged. Small vessel ischemic disease is not likely at this age unless there is an underlying vasculitis/vasculopathy. The findings are not suggestive of demyelinating disease"   "Small areas of signal abnormality in subcortical parietal regions".    As above  -follows with [facility] nsx outpt

## 2020-11-08 NOTE — PROGRESS NOTE ADULT - PROBLEM SELECTOR PLAN 5
# Fall/LOC  seizure vs cardiac vs hypotension/orthostasis; hx does not sound mechanical  -spot eeg negative  -tropes wnl  - ECG wnl

## 2020-11-08 NOTE — PROGRESS NOTE ADULT - ASSESSMENT
25 yo F w/ hx of migraine (on Amitriptyline), pituitary lesion and nonspecific subcortical parietal white matter lesions  seen on MRI Head at [facility] in 08/2020 ,who presents as a code stroke for sudden onset stutter and R hand tremor. On adm w/ fever Tmax 100.4 x1, no leukocytosis or electrolyte abnlties. CTH and CTA wnl in ED;  LP unrevealing. MRI brain showing nonspecific focus of T2/flair signal hyperintensity within the L parietal  lobe, likely the same lesions seen in August prior to onset of stutter. MRI total spine unremarkable, except for low marrow signal which may be related to anemia. 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  multiple sclerosis/ other dymyelinating dz vs. concussion (but pt denying hitting head) vs. seizure (post ictal)  vs other structural CNS lesion vs conversion disorder  vs vasculitis vs mood/psych (though less likely as pt denying emotional sx appears euthymic, organized, pleasant)

## 2020-11-09 LAB
24R-OH-CALCIDIOL SERPL-MCNC: 22.2 NG/ML — LOW (ref 30–80)
A1C WITH ESTIMATED AVERAGE GLUCOSE RESULT: 5.2 % — SIGNIFICANT CHANGE UP (ref 4–5.6)
AMITRIP SERPL-MCNC: <5 MCG/L — SIGNIFICANT CHANGE UP
AMITRIP+NOR SERPL-MCNC: <5 MCG/L — LOW (ref 100–250)
ANION GAP SERPL CALC-SCNC: 14 MMOL/L — SIGNIFICANT CHANGE UP (ref 5–17)
BUN SERPL-MCNC: 12 MG/DL — SIGNIFICANT CHANGE UP (ref 7–23)
C3 SERPL-MCNC: 148 MG/DL — SIGNIFICANT CHANGE UP (ref 81–157)
C4 SERPL-MCNC: 23 MG/DL — SIGNIFICANT CHANGE UP (ref 13–39)
CALCIUM SERPL-MCNC: 9.3 MG/DL — SIGNIFICANT CHANGE UP (ref 8.4–10.5)
CHLORIDE SERPL-SCNC: 103 MMOL/L — SIGNIFICANT CHANGE UP (ref 96–108)
CK SERPL-CCNC: 43 U/L — SIGNIFICANT CHANGE UP (ref 25–170)
CO2 SERPL-SCNC: 21 MMOL/L — LOW (ref 22–31)
CORTIS AM PEAK SERPL-MCNC: 4 UG/DL — LOW (ref 6–18.4)
CREAT SERPL-MCNC: 0.6 MG/DL — SIGNIFICANT CHANGE UP (ref 0.5–1.3)
CULTURE RESULTS: SIGNIFICANT CHANGE UP
ESTIMATED AVERAGE GLUCOSE: 103 MG/DL — SIGNIFICANT CHANGE UP (ref 68–114)
GLUCOSE SERPL-MCNC: 168 MG/DL — HIGH (ref 70–99)
HCT VFR BLD CALC: 40.8 % — SIGNIFICANT CHANGE UP (ref 34.5–45)
HGB BLD-MCNC: 13.1 G/DL — SIGNIFICANT CHANGE UP (ref 11.5–15.5)
MAGNESIUM SERPL-MCNC: 1.9 MG/DL — SIGNIFICANT CHANGE UP (ref 1.6–2.6)
MCHC RBC-ENTMCNC: 27.9 PG — SIGNIFICANT CHANGE UP (ref 27–34)
MCHC RBC-ENTMCNC: 32.1 GM/DL — SIGNIFICANT CHANGE UP (ref 32–36)
MCV RBC AUTO: 87 FL — SIGNIFICANT CHANGE UP (ref 80–100)
NORTRIP SERPL-MCNC: <20 NG/ML — LOW (ref 70–170)
NORTRIP SERPL-MCNC: <5 MCG/L — SIGNIFICANT CHANGE UP
NRBC # BLD: 0 /100 WBCS — SIGNIFICANT CHANGE UP (ref 0–0)
PHOSPHATE SERPL-MCNC: 2.4 MG/DL — LOW (ref 2.5–4.5)
PLATELET # BLD AUTO: 362 K/UL — SIGNIFICANT CHANGE UP (ref 150–400)
POTASSIUM SERPL-MCNC: 3.7 MMOL/L — SIGNIFICANT CHANGE UP (ref 3.5–5.3)
POTASSIUM SERPL-SCNC: 3.7 MMOL/L — SIGNIFICANT CHANGE UP (ref 3.5–5.3)
RAPID RVP RESULT: SIGNIFICANT CHANGE UP
RBC # BLD: 4.69 M/UL — SIGNIFICANT CHANGE UP (ref 3.8–5.2)
RBC # FLD: 12.7 % — SIGNIFICANT CHANGE UP (ref 10.3–14.5)
SARS-COV-2 RNA SPEC QL NAA+PROBE: SIGNIFICANT CHANGE UP
SODIUM SERPL-SCNC: 138 MMOL/L — SIGNIFICANT CHANGE UP (ref 135–145)
SPECIMEN SOURCE: SIGNIFICANT CHANGE UP
WBC # BLD: 14.34 K/UL — HIGH (ref 3.8–10.5)
WBC # FLD AUTO: 14.34 K/UL — HIGH (ref 3.8–10.5)

## 2020-11-09 PROCEDURE — 99232 SBSQ HOSP IP/OBS MODERATE 35: CPT

## 2020-11-09 PROCEDURE — 99233 SBSQ HOSP IP/OBS HIGH 50: CPT | Mod: GC

## 2020-11-09 RX ADMIN — ONDANSETRON 4 MILLIGRAM(S): 8 TABLET, FILM COATED ORAL at 09:24

## 2020-11-09 RX ADMIN — Medication 120 MILLIGRAM(S): at 05:29

## 2020-11-09 RX ADMIN — Medication 650 MILLIGRAM(S): at 03:30

## 2020-11-09 RX ADMIN — Medication 650 MILLIGRAM(S): at 03:01

## 2020-11-09 RX ADMIN — LIDOCAINE 2 PATCH: 4 CREAM TOPICAL at 01:00

## 2020-11-09 RX ADMIN — Medication 650 MILLIGRAM(S): at 13:00

## 2020-11-09 RX ADMIN — Medication 50 MILLIGRAM(S): at 17:30

## 2020-11-09 RX ADMIN — Medication 650 MILLIGRAM(S): at 11:49

## 2020-11-09 RX ADMIN — Medication 50 MILLIGRAM(S): at 05:29

## 2020-11-09 RX ADMIN — LIDOCAINE 2 PATCH: 4 CREAM TOPICAL at 22:06

## 2020-11-09 RX ADMIN — LIDOCAINE 2 PATCH: 4 CREAM TOPICAL at 11:48

## 2020-11-09 RX ADMIN — LIDOCAINE 2 PATCH: 4 CREAM TOPICAL at 19:35

## 2020-11-09 NOTE — PHYSICAL THERAPY INITIAL EVALUATION ADULT - GENERAL OBSERVATIONS, REHAB EVAL
Recvd semi supine, english speaking, mother present who is Chadian speaking- service used as pt with +stutter and HA with speaking

## 2020-11-09 NOTE — PHYSICAL THERAPY INITIAL EVALUATION ADULT - ADDITIONAL COMMENTS
Per care coord note: Prior to admission, patient resided in an apt with mother and was independent in adl's. Has no steps to front door Patient resided in an apt with mother and was independent in adl's. 3 CHRIS. Pt does not own AD. Patient resided in an apt with mother and was independent in adl's and ambulation. 3 CHRIS. Pt does not own AD.

## 2020-11-09 NOTE — PROGRESS NOTE ADULT - PROBLEM SELECTOR PLAN 3
MRI at [facility] in 08/2020:  1. 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   2. Non specific cerebral white matter lesions unchanged. Small vessel ischemic disease is not likely at this age unless there is an underlying vasculitis/vasculopathy. The findings are not suggestive of demyelinating disease"   "Small areas of signal abnormality in subcortical parietal regions".    As above  -follows with [facility] nsx outpt

## 2020-11-09 NOTE — PHYSICAL THERAPY INITIAL EVALUATION ADULT - PLANNED THERAPY INTERVENTIONS, PT EVAL
strengthening/transfer training/bed mobility training/balance training/gait training/stair training: GOAL: Pt will negotiate up/down 3 steps, w/ SUP assist, w/ step-to pattern in 2 weeks

## 2020-11-09 NOTE — PHYSICAL THERAPY INITIAL EVALUATION ADULT - PRECAUTIONS/LIMITATIONS, REHAB EVAL
CONTINUED...MRI brain showing nonspecific focus of T2/flair signal hyperintensity within the L parietal  lobe, likely the same lesions seen in August prior to onset of stutter. MRI total spine unremarkable, except for low marrow signal which may be related to anemia. Remains with stutter, HA, L-sided weakness, currently working dx of hemiplegic migraine. CONTINUED...MRI brain showing nonspecific focus of T2/flair signal hyperintensity within the L parietal  lobe, likely the same lesions seen in August prior to onset of stutter. MRI total spine unremarkable, except for low marrow signal which may be related to anemia. Remains with stutter, HA, L-sided weakness, currently working dx of hemiplegic migraine. MR Head: Nonspecific focus of T2/FLAIR signal hyperintensity within the left parietal lobe on the images referenced above. This may represent a focus of infection inflammation demyelination or ischemia. Clinical correlation recommended. CONTINUED...MRI brain showing nonspecific focus of T2/flair signal hyperintensity within the L parietal  lobe, likely the same lesions seen in August prior to onset of stutter. MRI total spine unremarkable, except for low marrow signal which may be related to anemia. Remains with stutter, HA, L-sided weakness, currently working dx of hemiplegic migraine. MR Head: Nonspecific focus of T2/FLAIR signal hyperintensity within the left parietal lobe on the images referenced above. This may represent a focus of infection inflammation demyelination or ischemia. Clinical correlation recommended./fall precautions

## 2020-11-09 NOTE — PROGRESS NOTE ADULT - SUBJECTIVE AND OBJECTIVE BOX
PROGRESS NOTE:     CONTACT INFO:  Laura Quiroga MD | PGY-1  Pager: [phone] [place], [id number] [facility]  After 7pm page night float      Patient is a 24y old  Female who presents with a chief complaint of new onset stutter and R hand tremor (08 Nov 2020 09:09)      SUBJECTIVE / OVERNIGHT EVENTS:    - No acute events overnight.   - No fevers/chills.  - Patient seen and evaluated at bedside.  - Denies SOB at rest, chest pain, palpitations, abdominal pain, nausea/vomiting  - Reports HA somewhat improved though still present. L-sided weakness improved as well but still present. Feels shaky as if she had worked out intensely prior.    ADDITIONAL REVIEW OF SYSTEMS:    MEDICATIONS  (STANDING):  lidocaine   Patch 2 Patch Transdermal daily  methylPREDNISolone sodium succinate Injectable 50 milliGRAM(s) IV Push two times a day  sodium chloride 0.9%. 1000 milliLiter(s) (100 mL/Hr) IV Continuous <Continuous>  verapamil  milliGRAM(s) Oral daily    MEDICATIONS  (PRN):  acetaminophen   Tablet .. 650 milliGRAM(s) Oral every 6 hours PRN Mild Pain (1 - 3), Moderate Pain (4 - 6)  ondansetron Injectable 4 milliGRAM(s) IV Push every 6 hours PRN Nausea and/or Vomiting      CAPILLARY BLOOD GLUCOSE        I&O's Summary    08 Nov 2020 07:01  -  09 Nov 2020 07:00  --------------------------------------------------------  IN: 720 mL / OUT: 0 mL / NET: 720 mL        PHYSICAL EXAM:  Vital Signs Last 24 Hrs  T(C): 37.1 (09 Nov 2020 04:23), Max: 37.4 (08 Nov 2020 20:18)  T(F): 98.8 (09 Nov 2020 04:23), Max: 99.4 (08 Nov 2020 20:18)  HR: 82 (09 Nov 2020 04:23) (79 - 103)  BP: 102/66 (09 Nov 2020 04:23) (102/66 - 115/85)  BP(mean): --  RR: 18 (09 Nov 2020 04:23) (17 - 18)  SpO2: 96% (09 Nov 2020 04:23) (94% - 97%)    CONSTITUTIONAL: NAD, well-developed  RESPIRATORY: Normal respiratory effort; lungs are clear to auscultation bilaterally  CARDIOVASCULAR: Regular rate and rhythm, normal S1 and S2, no murmur/rub/gallop; No lower extremity edema; Peripheral pulses are 2+ bilaterally  ABDOMEN: Nontender to palpation, normoactive bowel sounds, no rebound/guarding; No hepatosplenomegaly  MUSCLOSKELETAL: no clubbing or cyanosis of digits; no joint swelling or tenderness to palpation  PSYCH: A+O to person, place, and time; affect appropriate  NERVOUS SYSTEM:  Alert & Oriented X3, Good concentration; + stutter, CN II-VII intact (L shoulder shrug weak)  Strength: LUE 4/5 LLE 4/5, RUE and RLE 5/5  Sensory: intact sensation   Cerebellar: no dysmetria on FNF, no dysdiadokinesia, no abnlty on heel shin  PSYCH: aaox3, appropriate, pleasant, reporting normal mood normally though currently feeling frustrated d/t stutter; thought process linear, with good content    LABS:                        13.2   6.58  )-----------( 299      ( 08 Nov 2020 06:58 )             41.1     11-08    139  |  106  |  10  ----------------------------<  91  4.0   |  23  |  0.66    Ca    9.2      08 Nov 2020 06:56  Phos  3.6     11-08  Mg     2.0     11-08                  RADIOLOGY & ADDITIONAL TESTS:  Results Reviewed:   Imaging Personally Reviewed:  Electrocardiogram Personally Reviewed:    COORDINATION OF CARE:  Care Discussed with Consultants/Other Providers [Y/N]: Y  Prior or Outpatient Records Reviewed [Y/N]: Y

## 2020-11-09 NOTE — PROGRESS NOTE ADULT - PROBLEM SELECTOR PLAN 4
-currently with HA which feels different than usual migraine  -hold  home amitryptyline 10mg qd.  -toradol standing x1 d- did not help

## 2020-11-09 NOTE — PROGRESS NOTE ADULT - ASSESSMENT
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 One low grade temperature to 100.4 on admission resolved  WBC normal, s/p LP with 1 WBC, pr: 19, Glu: 59, HSV PCR and cx negative  brain MRI: Nonspecific focus of T2/FLAIR signal hyperintensity within the left parietal lobe on the images referenced above. This may represent a focus of infection inflammation demyelination or ischemia.     headache with sudden stutter and a fever of 100.4 but no evidence of meningoencephalitis, infection w/u negative  there was a ?hemorrhagic pituitary lesion before and now a hyperintensity focus in the  parietal lobe that neuro is working up    *  CSF PCR, HSV PCR, CSF VDRL negative  * pt improving on steroids  * f/u with neurology  * will sign off, please call with questions    The above assessment and plan was discussed with the primary team    Olivia Crabtree MD  Pager [phone]  After 5pm and on weekends call [phone]

## 2020-11-09 NOTE — PROGRESS NOTE ADULT - SUBJECTIVE AND OBJECTIVE BOX
Follow Up:  fever, headache and new stutter     Interval History: all infection w/u negative and pt off antibiotics afebrile on steroids, improved headache and weakness    ROS:      All other systems negative    Constitutional: no fever, no chills  Cardiovascular:  no chest pain, no palpitation  Respiratory:  no SOB, no cough  GI:  no abd pain, no vomiting, no diarrhea  urinary: no dysuria, no hematuria, no flank pain  musculoskeletal:  no joint pain, no joint swelling  skin:  no rash  neurology:  improved headache, still with stutter          Allergies  No Known Allergies        ANTIMICROBIALS:      OTHER MEDS:  acetaminophen   Tablet .. 650 milliGRAM(s) Oral every 6 hours PRN  lidocaine   Patch 2 Patch Transdermal daily  methylPREDNISolone sodium succinate Injectable 50 milliGRAM(s) IV Push two times a day  ondansetron Injectable 4 milliGRAM(s) IV Push every 6 hours PRN  sodium chloride 0.9%. 1000 milliLiter(s) IV Continuous <Continuous>  verapamil  milliGRAM(s) Oral daily      Vital Signs Last 24 Hrs  T(C): 37 (09 Nov 2020 11:41), Max: 37.4 (08 Nov 2020 20:18)  T(F): 98.6 (09 Nov 2020 11:41), Max: 99.4 (08 Nov 2020 20:18)  HR: 93 (09 Nov 2020 11:41) (82 - 103)  BP: 111/72 (09 Nov 2020 11:41) (102/66 - 112/71)  BP(mean): --  RR: 18 (09 Nov 2020 11:41) (18 - 18)  SpO2: 96% (09 Nov 2020 11:41) (94% - 96%)    Physical Exam:  General:    NAD,  non toxic  Cardio:     regular S1, S2,  no murmur  Respiratory:    clear b/l,    no wheezing  abd:     soft,   BS +,   no tenderness  :   no CVAT,  no suprapubic tenderness,   no  márquez  Musculoskeletal:   no joint swelling  vascular: no phlebitis  Skin:    no rash  Neurologic:   stutter                            13.1   14.34 )-----------( 362      ( 09 Nov 2020 11:42 )             40.8       11-09    138  |  103  |  12  ----------------------------<  168<H>  3.7   |  21<L>  |  0.60    Ca    9.3      09 Nov 2020 11:42  Phos  2.4     11-09  Mg     1.9     11-09            MICROBIOLOGY:  v  .Blood Blood  11-04-20   No growth to date.  --  --      .Urine Clean Catch (Midstream)  11-04-20   <10,000 CFU/mL Normal Urogenital Libby  --  --      .CSF CSF  11-04-20   No growth at 3 days.  --    No polymorphonuclear cells seen  No organisms seen  by cytocentrifuge          Rapid RVP Result: NotDetec (11-09 @ 08:01)  HSV 1/2 PCR: NotDetec (11-05 @ 02:40)        RADIOLOGY:  Images independently visualized and reviewed personally, findings as below  < from: MR Spectroscopy (11.08.20 @ 22:06) >  IMPRESSION:    Similar-appearing nonspecific 5 mm focus of increased signal intensity on FLAIR images within the left parietal subcortical white matter.    Nonspecific 3 mm focus of increased signal intensity on FLAIR images within the right parietal subcortical white matter was not well appreciated on the prior examination.    Single voxel spectroscopy of the left parietal subcortical lesion demonstrates similar CIERRA to creatine and choline to creatine ratios compared to the presumably normal contralateral side. This does not support the presence of neoplasm. However, because of the small size of the left parietal white matter lesion, a large volume of normal brain tissue was included in the lesional voxel.    < end of copied text >  < from: MR Lumbar Spine w/wo IV Cont (11.06.20 @ 10:42) >    IMPRESSION: Unremarkable MRI of the cervical, thoracic, and lumbosacral spine except for low marrow signal which may be related to anemia. No masses or abnormal enhancement. No abnormal cord signal.      < end of copied text >  < from: MR Head w/wo IV Cont (11.05.20 @ 09:38) >    IMPRESSION:  Nonspecific focus of T2/FLAIR signal hyperintensity within the left parietal lobe on the images referenced above. This may represent a focus of infection inflammation demyelination or ischemia. Clinical correlation recommended.

## 2020-11-09 NOTE — PHYSICAL THERAPY INITIAL EVALUATION ADULT - PERTINENT HX OF CURRENT PROBLEM, REHAB EVAL
23 yo F w/ hx of migraine (on Amitriptyline), pituitary lesion and nonspecific subcortical parietal white matter lesions  seen on MRI Head at [facility] in 08/2020 ,who presents as a code stroke for sudden onset stutter and R hand tremor. On adm w/ fever Tmax 100.4 x1, no leukocytosis or electrolyte abnlties. CTH and CTA wnl in ED;  LP unrevealing....

## 2020-11-09 NOTE — PROGRESS NOTE ADULT - ASSESSMENT
23 yo F w/ hx of migraine (on Amitriptyline), pituitary lesion and nonspecific subcortical parietal white matter lesions  seen on MRI Head at [facility] in 08/2020 ,who presents as a code stroke for sudden onset stutter and R hand tremor. On adm w/ fever Tmax 100.4 x1, no leukocytosis or electrolyte abnlties. CTH and CTA wnl in ED;  LP unrevealing. MRI brain showing nonspecific focus of T2/flair signal hyperintensity within the L parietal  lobe, likely the same lesions seen in August prior to onset of stutter. MRI total spine unremarkable, except for low marrow signal which may be related to anemia. Remains with stutter, HA, L-sided weakness, currently working dx of hemiplegic migraine.    Ddx 2/2 psychogenic stutter, vs.  multiple sclerosis/ other dymyelinating dz vs. concussion (but pt denying hitting head) vs. seizure (post ictal)  vs other structural CNS lesion vs conversion disorder  vs vasculitis vs mood/psych (though less likely as pt denying emotional sx appears euthymic, organized, pleasant)

## 2020-11-09 NOTE — PROGRESS NOTE ADULT - PROBLEM SELECTOR PLAN 1
#New onset stutter and tremor (tremor resolved)  MRI brain 11/5 Nonspecific focus of T2/FLAIR signal hyperintensity within the left parietal lobe on the images referenced above. This may represent a focus of infection inflammation demyelination or ischemia.As per d/w neuro on 11/6 can be seen in migraine. Does not appear to be meningitis as LP unrevealing, HSV negative, Syphilis negative.  -appreciate neuro c/s in ED - feels not to be a seizure or neurological in origin  -MRI C-spine, t-spine, c-spine, unrevealing  -EEG, excess beta activity - as per neuro this is nonspecific  - CD given to medical records on 11/6 to upload  -speech eval for stutter, will need prescription on discharge for outpatient speech therapy   -Psych consult- unlikely to be psych; s/p trial of ativan without improvement- will discontinue as no improvement  -sx may be related migraine:   11/9  trial verapamil 120 SR qd (will uptitrate as tolerated), methyprednisolone 50mg IV  BID  -still c/o shoulder pain - trial lidocaine patches  -MR spect to eval parietal lesion further as currently still unclear etiology

## 2020-11-10 ENCOUNTER — TRANSCRIPTION ENCOUNTER (OUTPATIENT)
Age: 25
End: 2020-11-10

## 2020-11-10 VITALS
SYSTOLIC BLOOD PRESSURE: 100 MMHG | DIASTOLIC BLOOD PRESSURE: 67 MMHG | OXYGEN SATURATION: 94 % | TEMPERATURE: 98 F | HEART RATE: 72 BPM | RESPIRATION RATE: 17 BRPM

## 2020-11-10 DIAGNOSIS — H60.90 UNSPECIFIED OTITIS EXTERNA, UNSPECIFIED EAR: ICD-10-CM

## 2020-11-10 DIAGNOSIS — M26.629 ARTHRALGIA OF TEMPOROMANDIBULAR JOINT, UNSPECIFIED SIDE: ICD-10-CM

## 2020-11-10 LAB
ACRM MODULATING ANTIBODY: 0 NMOL/L — SIGNIFICANT CHANGE UP
ALBUMIN SERPL ELPH-MCNC: 4.2 G/DL — SIGNIFICANT CHANGE UP (ref 3.3–5)
ALP SERPL-CCNC: 96 U/L — SIGNIFICANT CHANGE UP (ref 40–120)
ALT FLD-CCNC: 16 U/L — SIGNIFICANT CHANGE UP (ref 10–45)
ANA TITR SER: NEGATIVE — SIGNIFICANT CHANGE UP
ANION GAP SERPL CALC-SCNC: 14 MMOL/L — SIGNIFICANT CHANGE UP (ref 5–17)
AST SERPL-CCNC: 15 U/L — SIGNIFICANT CHANGE UP (ref 10–40)
AUTO DIFF PNL BLD: NEGATIVE — SIGNIFICANT CHANGE UP
B BURGDOR DNA SPEC QL NAA+PROBE: NEGATIVE — SIGNIFICANT CHANGE UP
BASOPHILS # BLD AUTO: 0.02 K/UL — SIGNIFICANT CHANGE UP (ref 0–0.2)
BASOPHILS NFR BLD AUTO: 0.1 % — SIGNIFICANT CHANGE UP (ref 0–2)
BILIRUB SERPL-MCNC: 0.1 MG/DL — LOW (ref 0.2–1.2)
BUN SERPL-MCNC: 14 MG/DL — SIGNIFICANT CHANGE UP (ref 7–23)
C-ANCA SER-ACNC: NEGATIVE — SIGNIFICANT CHANGE UP
CALCIUM SERPL-MCNC: 9.4 MG/DL — SIGNIFICANT CHANGE UP (ref 8.4–10.5)
CHLORIDE SERPL-SCNC: 104 MMOL/L — SIGNIFICANT CHANGE UP (ref 96–108)
CO2 SERPL-SCNC: 20 MMOL/L — LOW (ref 22–31)
CREAT SERPL-MCNC: 0.49 MG/DL — LOW (ref 0.5–1.3)
DRVVT SCREEN TO CONFIRM RATIO: SIGNIFICANT CHANGE UP
DSDNA AB SER-ACNC: 32 IU/ML — HIGH
EOSINOPHIL # BLD AUTO: 0 K/UL — SIGNIFICANT CHANGE UP (ref 0–0.5)
EOSINOPHIL NFR BLD AUTO: 0 % — SIGNIFICANT CHANGE UP (ref 0–6)
GLUCOSE SERPL-MCNC: 118 MG/DL — HIGH (ref 70–99)
HCT VFR BLD CALC: 40.6 % — SIGNIFICANT CHANGE UP (ref 34.5–45)
HGB BLD-MCNC: 13.1 G/DL — SIGNIFICANT CHANGE UP (ref 11.5–15.5)
IMM GRANULOCYTES NFR BLD AUTO: 1.1 % — SIGNIFICANT CHANGE UP (ref 0–1.5)
LA NT DPL PPP QL: 32.7 SEC — SIGNIFICANT CHANGE UP
LYMPHOCYTES # BLD AUTO: 1.45 K/UL — SIGNIFICANT CHANGE UP (ref 1–3.3)
LYMPHOCYTES # BLD AUTO: 8.2 % — LOW (ref 13–44)
MAGNESIUM SERPL-MCNC: 2.2 MG/DL — SIGNIFICANT CHANGE UP (ref 1.6–2.6)
MCHC RBC-ENTMCNC: 28.5 PG — SIGNIFICANT CHANGE UP (ref 27–34)
MCHC RBC-ENTMCNC: 32.3 GM/DL — SIGNIFICANT CHANGE UP (ref 32–36)
MCV RBC AUTO: 88.5 FL — SIGNIFICANT CHANGE UP (ref 80–100)
MONOCYTES # BLD AUTO: 0.34 K/UL — SIGNIFICANT CHANGE UP (ref 0–0.9)
MONOCYTES NFR BLD AUTO: 1.9 % — LOW (ref 2–14)
NEUTROPHILS # BLD AUTO: 15.71 K/UL — HIGH (ref 1.8–7.4)
NEUTROPHILS NFR BLD AUTO: 88.7 % — HIGH (ref 43–77)
NORMALIZED SCT PPP-RTO: 1.06 RATIO — SIGNIFICANT CHANGE UP (ref 0–1.16)
NORMALIZED SCT PPP-RTO: SIGNIFICANT CHANGE UP
NRBC # BLD: 0 /100 WBCS — SIGNIFICANT CHANGE UP (ref 0–0)
P-ANCA SER-ACNC: NEGATIVE — SIGNIFICANT CHANGE UP
PHOSPHATE SERPL-MCNC: 2.9 MG/DL — SIGNIFICANT CHANGE UP (ref 2.5–4.5)
PLATELET # BLD AUTO: 375 K/UL — SIGNIFICANT CHANGE UP (ref 150–400)
POTASSIUM SERPL-MCNC: 4.1 MMOL/L — SIGNIFICANT CHANGE UP (ref 3.5–5.3)
POTASSIUM SERPL-SCNC: 4.1 MMOL/L — SIGNIFICANT CHANGE UP (ref 3.5–5.3)
PROT SERPL-MCNC: 7.4 G/DL — SIGNIFICANT CHANGE UP (ref 6–8.3)
RBC # BLD: 4.59 M/UL — SIGNIFICANT CHANGE UP (ref 3.8–5.2)
RBC # FLD: 13.1 % — SIGNIFICANT CHANGE UP (ref 10.3–14.5)
SODIUM SERPL-SCNC: 138 MMOL/L — SIGNIFICANT CHANGE UP (ref 135–145)
WBC # BLD: 17.71 K/UL — HIGH (ref 3.8–10.5)
WBC # FLD AUTO: 17.71 K/UL — HIGH (ref 3.8–10.5)

## 2020-11-10 PROCEDURE — 72157 MRI CHEST SPINE W/O & W/DYE: CPT

## 2020-11-10 PROCEDURE — 92610 EVALUATE SWALLOWING FUNCTION: CPT

## 2020-11-10 PROCEDURE — 85652 RBC SED RATE AUTOMATED: CPT

## 2020-11-10 PROCEDURE — 87046 STOOL CULTR AEROBIC BACT EA: CPT

## 2020-11-10 PROCEDURE — 86160 COMPLEMENT ANTIGEN: CPT

## 2020-11-10 PROCEDURE — 70450 CT HEAD/BRAIN W/O DYE: CPT

## 2020-11-10 PROCEDURE — 93005 ELECTROCARDIOGRAM TRACING: CPT | Mod: XU

## 2020-11-10 PROCEDURE — 80307 DRUG TEST PRSMV CHEM ANLYZR: CPT

## 2020-11-10 PROCEDURE — 95816 EEG AWAKE AND DROWSY: CPT

## 2020-11-10 PROCEDURE — 86592 SYPHILIS TEST NON-TREP QUAL: CPT

## 2020-11-10 PROCEDURE — 87483 CNS DNA AMP PROBE TYPE 12-25: CPT

## 2020-11-10 PROCEDURE — 82945 GLUCOSE OTHER FLUID: CPT

## 2020-11-10 PROCEDURE — 84295 ASSAY OF SERUM SODIUM: CPT

## 2020-11-10 PROCEDURE — 70553 MRI BRAIN STEM W/O & W/DYE: CPT

## 2020-11-10 PROCEDURE — 72158 MRI LUMBAR SPINE W/O & W/DYE: CPT

## 2020-11-10 PROCEDURE — 86788 WEST NILE VIRUS AB IGM: CPT

## 2020-11-10 PROCEDURE — 82705 FATS/LIPIDS FECES QUAL: CPT

## 2020-11-10 PROCEDURE — 83615 LACTATE (LD) (LDH) ENZYME: CPT

## 2020-11-10 PROCEDURE — 87476 LYME DIS DNA AMP PROBE: CPT

## 2020-11-10 PROCEDURE — 84702 CHORIONIC GONADOTROPIN TEST: CPT

## 2020-11-10 PROCEDURE — 86789 WEST NILE VIRUS ANTIBODY: CPT

## 2020-11-10 PROCEDURE — 86140 C-REACTIVE PROTEIN: CPT

## 2020-11-10 PROCEDURE — 71045 X-RAY EXAM CHEST 1 VIEW: CPT

## 2020-11-10 PROCEDURE — 92507 TX SP LANG VOICE COMM INDIV: CPT

## 2020-11-10 PROCEDURE — 86041 ACETYLCHOLN RCPTR BNDNG ANTB: CPT

## 2020-11-10 PROCEDURE — 70498 CT ANGIOGRAPHY NECK: CPT

## 2020-11-10 PROCEDURE — 85025 COMPLETE CBC W/AUTO DIFF WBC: CPT

## 2020-11-10 PROCEDURE — U0003: CPT

## 2020-11-10 PROCEDURE — 86255 FLUORESCENT ANTIBODY SCREEN: CPT

## 2020-11-10 PROCEDURE — 83605 ASSAY OF LACTIC ACID: CPT

## 2020-11-10 PROCEDURE — 86036 ANCA SCREEN EACH ANTIBODY: CPT

## 2020-11-10 PROCEDURE — 96374 THER/PROPH/DIAG INJ IV PUSH: CPT | Mod: XU

## 2020-11-10 PROCEDURE — 86225 DNA ANTIBODY NATIVE: CPT

## 2020-11-10 PROCEDURE — 80053 COMPREHEN METABOLIC PANEL: CPT

## 2020-11-10 PROCEDURE — A9585: CPT

## 2020-11-10 PROCEDURE — 80061 LIPID PANEL: CPT

## 2020-11-10 PROCEDURE — 97162 PT EVAL MOD COMPLEX 30 MIN: CPT

## 2020-11-10 PROCEDURE — 84484 ASSAY OF TROPONIN QUANT: CPT

## 2020-11-10 PROCEDURE — 87086 URINE CULTURE/COLONY COUNT: CPT

## 2020-11-10 PROCEDURE — 86703 HIV-1/HIV-2 1 RESULT ANTBDY: CPT

## 2020-11-10 PROCEDURE — 85610 PROTHROMBIN TIME: CPT

## 2020-11-10 PROCEDURE — 85730 THROMBOPLASTIN TIME PARTIAL: CPT

## 2020-11-10 PROCEDURE — 82330 ASSAY OF CALCIUM: CPT

## 2020-11-10 PROCEDURE — 86043 ACETYLCHOLN RCPTR MODLG ANTB: CPT

## 2020-11-10 PROCEDURE — 62270 DX LMBR SPI PNXR: CPT

## 2020-11-10 PROCEDURE — 83036 HEMOGLOBIN GLYCOSYLATED A1C: CPT

## 2020-11-10 PROCEDURE — 84100 ASSAY OF PHOSPHORUS: CPT

## 2020-11-10 PROCEDURE — 84157 ASSAY OF PROTEIN OTHER: CPT

## 2020-11-10 PROCEDURE — 86366 MUSCLE-SPECIFIC KINASE ANTB: CPT

## 2020-11-10 PROCEDURE — 80048 BASIC METABOLIC PNL TOTAL CA: CPT

## 2020-11-10 PROCEDURE — 0225U NFCT DS DNA&RNA 21 SARSCOV2: CPT

## 2020-11-10 PROCEDURE — 81001 URINALYSIS AUTO W/SCOPE: CPT

## 2020-11-10 PROCEDURE — 86780 TREPONEMA PALLIDUM: CPT

## 2020-11-10 PROCEDURE — 85014 HEMATOCRIT: CPT

## 2020-11-10 PROCEDURE — 82533 TOTAL CORTISOL: CPT

## 2020-11-10 PROCEDURE — 87507 IADNA-DNA/RNA PROBE TQ 12-25: CPT

## 2020-11-10 PROCEDURE — 87205 SMEAR GRAM STAIN: CPT

## 2020-11-10 PROCEDURE — 83735 ASSAY OF MAGNESIUM: CPT

## 2020-11-10 PROCEDURE — 87070 CULTURE OTHR SPECIMN AEROBIC: CPT

## 2020-11-10 PROCEDURE — 82947 ASSAY GLUCOSE BLOOD QUANT: CPT

## 2020-11-10 PROCEDURE — 96375 TX/PRO/DX INJ NEW DRUG ADDON: CPT | Mod: XU

## 2020-11-10 PROCEDURE — 89051 BODY FLUID CELL COUNT: CPT

## 2020-11-10 PROCEDURE — 82803 BLOOD GASES ANY COMBINATION: CPT

## 2020-11-10 PROCEDURE — 85018 HEMOGLOBIN: CPT

## 2020-11-10 PROCEDURE — 84132 ASSAY OF SERUM POTASSIUM: CPT

## 2020-11-10 PROCEDURE — 76390 MR SPECTROSCOPY: CPT

## 2020-11-10 PROCEDURE — 82435 ASSAY OF BLOOD CHLORIDE: CPT

## 2020-11-10 PROCEDURE — 87529 HSV DNA AMP PROBE: CPT

## 2020-11-10 PROCEDURE — 82550 ASSAY OF CK (CPK): CPT

## 2020-11-10 PROCEDURE — 99223 1ST HOSP IP/OBS HIGH 75: CPT

## 2020-11-10 PROCEDURE — 99239 HOSP IP/OBS DSCHRG MGMT >30: CPT | Mod: GC

## 2020-11-10 PROCEDURE — 86769 SARS-COV-2 COVID-19 ANTIBODY: CPT

## 2020-11-10 PROCEDURE — 85027 COMPLETE CBC AUTOMATED: CPT

## 2020-11-10 PROCEDURE — 87045 FECES CULTURE AEROBIC BACT: CPT

## 2020-11-10 PROCEDURE — 72156 MRI NECK SPINE W/O & W/DYE: CPT

## 2020-11-10 PROCEDURE — 83519 RIA NONANTIBODY: CPT

## 2020-11-10 PROCEDURE — 70496 CT ANGIOGRAPHY HEAD: CPT

## 2020-11-10 PROCEDURE — 86042 ACETYLCHOLN RCPTR BLCKG ANTB: CPT

## 2020-11-10 PROCEDURE — G0480: CPT

## 2020-11-10 PROCEDURE — 87040 BLOOD CULTURE FOR BACTERIA: CPT

## 2020-11-10 PROCEDURE — 86596 VOLTAGE-GTD CA CHNL ANTB EA: CPT

## 2020-11-10 PROCEDURE — 99285 EMERGENCY DEPT VISIT HI MDM: CPT | Mod: 25

## 2020-11-10 PROCEDURE — 82306 VITAMIN D 25 HYDROXY: CPT

## 2020-11-10 PROCEDURE — 82962 GLUCOSE BLOOD TEST: CPT

## 2020-11-10 PROCEDURE — 86038 ANTINUCLEAR ANTIBODIES: CPT

## 2020-11-10 PROCEDURE — 92523 SPEECH SOUND LANG COMPREHEN: CPT

## 2020-11-10 RX ORDER — LIDOCAINE 4 G/100G
2 CREAM TOPICAL
Qty: 24 | Refills: 0
Start: 2020-11-10 | End: 2020-11-19

## 2020-11-10 RX ORDER — DIPHENHYDRAMINE HCL 50 MG
1 CAPSULE ORAL
Qty: 10 | Refills: 0
Start: 2020-11-10 | End: 2020-11-14

## 2020-11-10 RX ORDER — PANTOPRAZOLE SODIUM 20 MG/1
1 TABLET, DELAYED RELEASE ORAL
Qty: 7 | Refills: 0
Start: 2020-11-10 | End: 2020-11-16

## 2020-11-10 RX ORDER — METOCLOPRAMIDE HCL 10 MG
1 TABLET ORAL
Qty: 10 | Refills: 0
Start: 2020-11-10 | End: 2020-11-14

## 2020-11-10 RX ORDER — KETOROLAC TROMETHAMINE 30 MG/ML
15 SYRINGE (ML) INJECTION ONCE
Refills: 0 | Status: DISCONTINUED | OUTPATIENT
Start: 2020-11-10 | End: 2020-11-10

## 2020-11-10 RX ORDER — DIPHENHYDRAMINE HCL 50 MG
25 CAPSULE ORAL ONCE
Refills: 0 | Status: DISCONTINUED | OUTPATIENT
Start: 2020-11-10 | End: 2020-11-10

## 2020-11-10 RX ORDER — PANTOPRAZOLE SODIUM 20 MG/1
40 TABLET, DELAYED RELEASE ORAL DAILY
Refills: 0 | Status: DISCONTINUED | OUTPATIENT
Start: 2020-11-10 | End: 2020-11-10

## 2020-11-10 RX ORDER — DIPHENHYDRAMINE HCL 50 MG
25 CAPSULE ORAL ONCE
Refills: 0 | Status: COMPLETED | OUTPATIENT
Start: 2020-11-10 | End: 2020-11-10

## 2020-11-10 RX ORDER — AMITRIPTYLINE HCL 25 MG
1 TABLET ORAL
Qty: 0 | Refills: 0 | DISCHARGE

## 2020-11-10 RX ORDER — METOCLOPRAMIDE HCL 10 MG
10 TABLET ORAL ONCE
Refills: 0 | Status: COMPLETED | OUTPATIENT
Start: 2020-11-10 | End: 2020-11-10

## 2020-11-10 RX ORDER — VERAPAMIL HCL 240 MG
1 CAPSULE, EXTENDED RELEASE PELLETS 24 HR ORAL
Qty: 30 | Refills: 0
Start: 2020-11-10 | End: 2020-12-09

## 2020-11-10 RX ADMIN — Medication 500 MILLIGRAM(S): at 12:24

## 2020-11-10 RX ADMIN — Medication 50 MILLIGRAM(S): at 04:54

## 2020-11-10 RX ADMIN — Medication 650 MILLIGRAM(S): at 04:53

## 2020-11-10 RX ADMIN — Medication 25 MILLIGRAM(S): at 12:24

## 2020-11-10 RX ADMIN — Medication 650 MILLIGRAM(S): at 06:03

## 2020-11-10 RX ADMIN — Medication 50 MILLIGRAM(S): at 17:16

## 2020-11-10 RX ADMIN — LIDOCAINE 2 PATCH: 4 CREAM TOPICAL at 11:08

## 2020-11-10 RX ADMIN — PANTOPRAZOLE SODIUM 40 MILLIGRAM(S): 20 TABLET, DELAYED RELEASE ORAL at 12:28

## 2020-11-10 RX ADMIN — Medication 500 MILLIGRAM(S): at 13:24

## 2020-11-10 RX ADMIN — Medication 120 MILLIGRAM(S): at 04:53

## 2020-11-10 RX ADMIN — Medication 15 MILLIGRAM(S): at 09:50

## 2020-11-10 RX ADMIN — Medication 15 MILLIGRAM(S): at 10:05

## 2020-11-10 RX ADMIN — Medication 10 MILLIGRAM(S): at 12:24

## 2020-11-10 NOTE — DIETITIAN INITIAL EVALUATION ADULT. - ADD RECOMMEND
1. Will continue to monitor PO intake, weight, labs, skin, GI status, diet. 2. Encourage PO intake and obtain food preferences (obtained at this time). 3. Provided recommendations to help with constipation. 4. Provide education on weight loss nutrition therapy if feasible/requested - pt and mom made aware RD remains available.

## 2020-11-10 NOTE — DIETITIAN INITIAL EVALUATION ADULT. - NS FNS WEIGHT CHANGE REASON
Pt reports weight gain x 6 months PTA, unable to recall reason - mom states likely hormonal and will follow with endocrinologist; unable to recall pounds gained or previous weight; pt reports current weight 208 pounds and height 61 inches. Weight as per flow sheets (11/04) 220 pounds -?accuracy, will continue to monitor.

## 2020-11-10 NOTE — DIETITIAN INITIAL EVALUATION ADULT. - REASON INDICATOR FOR ASSESSMENT
Pt seen for length of stay initial assessment.   Information obtained from: medical record, pt, and mom at bedside - both Ma[place]-Speaking, dietitian fluent in [place].

## 2020-11-10 NOTE — CONSULT NOTE ADULT - SUBJECTIVE AND OBJECTIVE BOX
CC: Bilateral ear pain L>R    HPI:  23 yo F w/ hx of migraine (on Amitriptyline), pituitary lesion and nonspecific subcortical parietal white matter lesions  seen on MRI Head at [facility] in 08/2020 ,who presents as a code stroke for sudden onset stutter and R hand tremor. On adm w/ fever Tmax 100.4 x1, no leukocytosis or electrolyte abnlties. CTH and CTA wnl in ED;  LP unrevealing. MRI brain showing nonspecific focus of T2/flair signal hyperintensity within the L parietal  lobe, likely the same lesions seen in August prior to onset of stutter. MRI total spine unremarkable, except for low marrow signal which may be related to anemia. Remains with stutter, HA, L-sided weakness, ?hemiplegic migraine, but neuro feels not neurogenic.  Pt. is Malian speaking - used  phone (#[phone]).   Pt. states she has headaches since admission with bilateral ear pain and discharge (describes discharge as sticky). Pt. denies HL, tinnitus,sinus pressure.         PAST MEDICAL & SURGICAL HISTORY:  Migraine    Gastritis    Metatarsal bone fracture    No significant past surgical history      Allergies    No Known Allergies    Intolerances      MEDICATIONS  (STANDING):  lidocaine   Patch 2 Patch Transdermal daily  methylPREDNISolone sodium succinate Injectable 50 milliGRAM(s) IV Push two times a day  pantoprazole    Tablet 40 milliGRAM(s) Oral daily  verapamil  milliGRAM(s) Oral daily    MEDICATIONS  (PRN):  acetaminophen   Tablet .. 650 milliGRAM(s) Oral every 6 hours PRN Mild Pain (1 - 3), Moderate Pain (4 - 6)  ondansetron Injectable 4 milliGRAM(s) IV Push every 6 hours PRN Nausea and/or Vomiting      Social History: Denies tobacco or drug use    Family history: No pertinent history in first degree relatives    ROS:   ENT: all negative except as noted in HPI   CV: denies palpitations  Pulm: denies SOB, cough, hemoptysis  GI: denies change in apetite, indigestion, n/v  : denies pertinent urinary symptoms, urgency  Neuro: denies numbness/tingling, loss of sensation  Psych: denies anxiety  MS: denies muscle weakness, instability  Heme: denies easy bruising or bleeding  Endo: denies heat/cold intolerance, excessive sweating  Vascular: denies LE edema    Vital Signs Last 24 Hrs  T(C): 36.8 (10 Nov 2020 13:50), Max: 37.1 (09 Nov 2020 20:30)  T(F): 98.3 (10 Nov 2020 13:50), Max: 98.7 (09 Nov 2020 20:30)  HR: 72 (10 Nov 2020 13:50) (72 - 87)  BP: 100/67 (10 Nov 2020 13:50) (100/67 - 123/75)  BP(mean): --  RR: 17 (10 Nov 2020 13:50) (17 - 17)  SpO2: 94% (10 Nov 2020 13:50) (94% - 96%)                          13.1   17.71 )-----------( 375      ( 10 Nov 2020 06:50 )             40.6    11-10    138  |  104  |  14  ----------------------------<  118<H>  4.1   |  20<L>  |  0.49<L>    Ca    9.4      10 Nov 2020 06:48  Phos  2.9     11-10  Mg     2.2     11-10    TPro  7.4  /  Alb  4.2  /  TBili  0.1<L>  /  DBili  x   /  AST  15  /  ALT  16  /  AlkPhos  96  11-10       PHYSICAL EXAM:  Gen: NAD, speaks with a stutter  Skin: No rashes, bruises, or lesions  Head: Normocephalic, Atraumatic  Face: no edema, erythema, or fluctuance. Parotid glands soft without mass  Eyes: no scleral injection  Ears: Right - ear canal clear, TM intact without effusion or erythema. No evidence of any fluid drainage. No mastoid tenderness, erythema, or ear bulging            Left - ear canal clear, TM intact without effusion or erythema. No evidence of any fluid drainage. No mastoid tenderness, erythema, or ear bulging, ? scarring of TM, pre auricular swelling and tenderness, no erythema, post auricular tenderness  Nose: Nares bilaterally patent, no discharge  Mouth: No Stridor / Drooling / Trismus.  Mucosa moist, tongue/uvula midline, oropharynx clear  Neck: Flat, supple, no lymphadenopathy, trachea midline, no masses  Lymphatic: No lymphadenopathy  Resp: breathing easily, no stridor  CV: no peripheral edema/cyanosis  GI: nondistended   Peripheral vascular: no JVD or edema  Neuro: facial nerve intact, no facial droop                    IMAGING/ADDITIONAL STUDIES:    CC: Bilateral ear pain L>R    HPI:  23 yo F w/ hx of migraine (on Amitriptyline), pituitary lesion and nonspecific subcortical parietal white matter lesions  seen on MRI Head at [facility] in 08/2020 ,who presents as a code stroke for sudden onset stutter and R hand tremor. On adm w/ fever Tmax 100.4 x1, no leukocytosis or electrolyte abnlties. CTH and CTA wnl in ED;  LP unrevealing. MRI brain showing nonspecific focus of T2/flair signal hyperintensity within the L parietal  lobe, likely the same lesions seen in August prior to onset of stutter. MRI total spine unremarkable, except for low marrow signal which may be related to anemia. Remains with stutter, HA, L-sided weakness, ?hemiplegic migraine, but neuro feels not neurogenic.  Pt. is Mexican speaking - used  phone (#[phone]).   Pt. states she has headaches since admission with bilateral ear pain and discharge (describes discharge as sticky). Pt. denies HL, tinnitus,sinus pressure.         PAST MEDICAL & SURGICAL HISTORY:  Migraine    Gastritis    Metatarsal bone fracture    No significant past surgical history      Allergies    No Known Allergies    Intolerances      MEDICATIONS  (STANDING):  lidocaine   Patch 2 Patch Transdermal daily  methylPREDNISolone sodium succinate Injectable 50 milliGRAM(s) IV Push two times a day  pantoprazole    Tablet 40 milliGRAM(s) Oral daily  verapamil  milliGRAM(s) Oral daily    MEDICATIONS  (PRN):  acetaminophen   Tablet .. 650 milliGRAM(s) Oral every 6 hours PRN Mild Pain (1 - 3), Moderate Pain (4 - 6)  ondansetron Injectable 4 milliGRAM(s) IV Push every 6 hours PRN Nausea and/or Vomiting      Social History: Denies tobacco or drug use    Family history: No pertinent history in first degree relatives    ROS:   ENT: all negative except as noted in HPI   CV: denies palpitations  Pulm: denies SOB, cough, hemoptysis  GI: denies change in apetite, indigestion, n/v  : denies pertinent urinary symptoms, urgency  Neuro: denies numbness/tingling, loss of sensation  Psych: denies anxiety  MS: denies muscle weakness, instability  Heme: denies easy bruising or bleeding  Endo: denies heat/cold intolerance, excessive sweating  Vascular: denies LE edema    Vital Signs Last 24 Hrs  T(C): 36.8 (10 Nov 2020 13:50), Max: 37.1 (09 Nov 2020 20:30)  T(F): 98.3 (10 Nov 2020 13:50), Max: 98.7 (09 Nov 2020 20:30)  HR: 72 (10 Nov 2020 13:50) (72 - 87)  BP: 100/67 (10 Nov 2020 13:50) (100/67 - 123/75)  BP(mean): --  RR: 17 (10 Nov 2020 13:50) (17 - 17)  SpO2: 94% (10 Nov 2020 13:50) (94% - 96%)                          13.1   17.71 )-----------( 375      ( 10 Nov 2020 06:50 )             40.6    11-10    138  |  104  |  14  ----------------------------<  118<H>  4.1   |  20<L>  |  0.49<L>    Ca    9.4      10 Nov 2020 06:48  Phos  2.9     11-10  Mg     2.2     11-10    TPro  7.4  /  Alb  4.2  /  TBili  0.1<L>  /  DBili  x   /  AST  15  /  ALT  16  /  AlkPhos  96  11-10       PHYSICAL EXAM:  Gen: NAD, speaks with a stutter  Skin: No rashes, bruises, or lesions  Head: Normocephalic, Atraumatic  Face: no edema, erythema, or fluctuance. Parotid glands soft without mass  Eyes: no scleral injection  Ears: Right - ear canal clear, TM intact without effusion or erythema. No evidence of any fluid drainage. No mastoid tenderness, erythema, or ear bulging            Left - ear canal clear, TM intact without effusion or erythema. No evidence of any fluid drainage. No mastoid tenderness, erythema, or ear bulging, pre- auricular swelling and tenderness, no erythema, tenderness from ear to jaw upon opening mouth(TMJ tenderness  Nose: Nares bilaterally patent, no discharge  Mouth: No Stridor / Drooling / Trismus.  Mucosa moist, tongue/uvula midline, oropharynx clear  Neck: Flat, supple, no lymphadenopathy, trachea midline, no masses  Lymphatic: No lymphadenopathy  Resp: breathing easily, no stridor  CV: no peripheral edema/cyanosis  GI: nondistended   Peripheral vascular: no JVD or edema  Neuro: facial nerve intact, no facial droop                    IMAGING/ADDITIONAL STUDIES:

## 2020-11-10 NOTE — PROGRESS NOTE ADULT - PROBLEM SELECTOR PLAN 3
MRI at [facility] in 08/2020:  1. 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   2. Non specific cerebral white matter lesions unchanged. Small vessel ischemic disease is not likely at this age unless there is an underlying vasculitis/vasculopathy. The findings are not suggestive of demyelinating disease"   "Small areas of signal abnormality in subcortical parietal regions".    As above  -follows with Health system nsx outpt

## 2020-11-10 NOTE — DISCHARGE NOTE PROVIDER - HOSPITAL COURSE
Dx: new onset stutter and L-sided weakness of unknown etiology - ?hemiplegic migraine vs psychogenic origin.    23 yo F w/ hx of migraine (on Amitriptyline), pituitary lesion and nonspecific subcortical parietal white matter lesions  seen on MRI Head at [facility] in 08/2020 ,who presents as a code stroke for sudden onset stutter and R hand tremor. On adm w/ fever Tmax 100.4 x1, no leukocytosis or electrolyte abnlties. CTH and CTA wnl in ED;  LP unrevealing (HSV, PCR negative). Syphilis negative. LP only notable for positive IgG, but negative IgM for west nile virus, suggesting past infection.  MRI brain showing nonspecific focus of T2/flair signal hyperintensity within the L parietal  lobe, likely the same lesions seen in August prior to onset of stutter. MRI total spine unremarkable, except for low marrow signal which may be related to anemia. MR Spect of brain performed to further evaluate , revealed an additional small lesion in R parietal lobe. Spot EEG negative (pt symptomatic at time of evaluation as well). No evidence of any demylinating disease on imaging. Discussed case with neurology team, these lesions can be seen in pts with migraine and are nonspecific. Felt weakness was due to poor effort  and stutter inconsistent with neurological etiology. Psychiatry consulted, not felt to be psychiatric.  Vasculitis was also considered, and ANCAs where negative. Pt's tremor resolved, but remained with stutter, HA, mild L-sided weakness. Pt was trialled on various treatments including low dose ativan, standing toradol, headache cocktail with minimal improvement. Started on verapamil and methylprednisolone as a trial of treatment for possible hemiplegic migraine. Pt seen by speech therapy and physical therapy.    She will go home on verapamil and steroids. Pt to f/u with Dr. Ruiz, a migraine specialist, her neurologist and neurosurgeon at [facility], and go home with speech therapy and physical therapy services. Dx: new onset stutter and L-sided weakness of unknown etiology - ?hemiplegic migraine vs psychogenic origin.    25 yo F w/ hx of migraine (on Amitriptyline), pituitary lesion and nonspecific subcortical parietal white matter lesions  seen on MRI Head at [facility] in 08/2020 ,who presents as a code stroke for sudden onset stutter and R hand tremor. On adm w/ fever Tmax 100.4 x1, no leukocytosis or electrolyte abnlties. CTH and CTA wnl in ED;  LP unrevealing (HSV, PCR negative). Syphilis negative. LP only notable for positive IgG, but negative IgM for west nile virus, suggesting past infection.  MRI brain showing nonspecific focus of T2/flair signal hyperintensity within the L parietal  lobe, likely the same lesions seen in August prior to onset of stutter. MRI total spine unremarkable, except for low marrow signal which may be related to anemia. MR Spect Brain revealed an additional small lesion in R parietal lobe. Spot EEG negative (pt symptomatic at time of evaluation as well). No evidence of any demylinating disease on imaging. Discussed case with neurology team, these lesions can be seen in pts with migraine and are nonspecific. Felt weakness was due to poor effort  and stutter inconsistent with neurological etiology. Psychiatry consulted, not felt to be psychiatric.  Vasculitis was also considered, and ANCAs where negative. Anti AChR negative. ENT evaluated patient.    Pt's tremor resolved, but remained with stutter, HA, mild L-sided weakness. Pt was trialled on various treatments including low dose ativan, standing toradol, headache cocktail with minimal improvement. Started on verapamil and methylprednisolone as a trial of treatment for possible hemiplegic migraine. Pt seen by speech therapy and physical therapy.    She will go home on verapamil and steroids. Pt to f/u with Dr. Ruiz, a migraine specialist, her neurologist and neurosurgeon at [facility], and go home with speech therapy and physical therapy services. Dx: new onset stutter and L-sided weakness of unknown etiology - ?hemiplegic migraine vs psychogenic origin.    23 yo F w/ hx of migraine (on Amitriptyline), pituitary lesion and nonspecific subcortical parietal white matter lesions  seen on MRI Head at [facility] in 08/2020 ,who presents as a code stroke for sudden onset stutter and R hand tremor. On adm w/ fever Tmax 100.4 x1, no leukocytosis or electrolyte abnlties. CTH and CTA wnl in ED;  LP unrevealing (HSV, PCR negative). Syphilis negative. LP only notable for positive IgG, but negative IgM for west nile virus, suggesting past infection.  MRI brain showing nonspecific focus of T2/flair signal hyperintensity within the L parietal  lobe, likely the same lesions seen in August prior to onset of stutter. MRI total spine unremarkable, except for low marrow signal which may be related to anemia. MR Spect Brain revealed an additional small lesion in R parietal lobe. Spot EEG negative (pt symptomatic at time of evaluation as well). No evidence of any demylinating disease on imaging. Discussed case with neurology team, these lesions can be seen in pts with migraine and are nonspecific. Felt weakness was due to poor effort  and stutter inconsistent with neurological etiology. Psychiatry consulted, not felt to be psychiatric.  Vasculitis was also considered, and ANCAs where negative. Anti AChR negative. ENT evaluated patient for c/o ear pain. No abnormalities seen, recommending warm compresses to L side of face and soft diet.    Pt's tremor resolved, but remained with stutter, HA, mild L-sided weakness. Pt was trialled on various treatments including low dose ativan, standing toradol, headache cocktail with minimal improvement. Started on verapamil and methylprednisolone as a trial of treatment for possible hemiplegic migraine. Pt seen by speech therapy and physical therapy.     She will go home on verapamil, steroids, and pantoprazole. In addition she will go home with metoclopramide, benadryl, lidocaine patches, PRN. Pt to f/u with Dr. Ruiz, a migraine specialist, her neurologist and neurosurgeon at [facility], and go home with speech therapy and physical therapy services.

## 2020-11-10 NOTE — CONSULT NOTE ADULT - ASSESSMENT
23 yo F w/ hx of migraine (on Amitriptyline), pituitary lesion and nonspecific subcortical parietal white matter lesions  seen on MRI Head at [facility] in 08/2020 ,who presents as a code stroke for sudden onset stutter and R hand tremor. CT head and CTA come back with no acute signs of a stroke. MRI brain showing nonspecific focus of T2/flair signal hyperintensity within the L parietal  lobe, likely the same lesions seen in August prior to onset of stutter. Pt. c/o bilateral ear pain L>R with a "sticky discharge" since admission. Pt. denies HL, tinnitus but c/o dizziness. On exam left ear +TTP of tragus, no swelling or erythema noted, TM intact without erythema or effusion.     25 yo F w/ hx of migraine (on Amitriptyline), pituitary lesion and nonspecific subcortical parietal white matter lesions  seen on MRI Head at [facility] in 08/2020 ,who presents as a code stroke for sudden onset stutter and R hand tremor. CT head and CTA come back with no acute signs of a stroke. MRI brain showing nonspecific focus of T2/flair signal hyperintensity within the L parietal  lobe, likely the same lesions seen in August prior to onset of stutter. Pt. c/o bilateral ear pain L>R with a "sticky discharge" since admission. Pt. denies HL, tinnitus but c/o dizziness. On exam left sided TMJ tenderness with sensation of crepitus upon opening mouth, no swelling or erythema noted, TM intact without erythema or effusion.

## 2020-11-10 NOTE — DISCHARGE NOTE PROVIDER - NSDCFUADDAPPT_GEN_ALL_CORE_FT
Follow up with Dr. Max Ruiz, a neurologist that specializes in migraine.  Follow up with your doctors at [facility], Dr. Roman, Dr. Paulino, and Dr. Mahmood.  Follow up with Dr. Keven Jimenez in Internal Medicine at [facility] [place].

## 2020-11-10 NOTE — PROGRESS NOTE ADULT - PROBLEM SELECTOR PLAN 2
#LLE LUE weakness w/ numbness ; now weakness improved and no longer with numbness  -intermittently comes back  -may be related to migraine  -IgG positive, IgM negative for West Nile virus - unclear signifcance - likely past infection

## 2020-11-10 NOTE — DISCHARGE NOTE PROVIDER - CARE PROVIDER_API CALL
Max Ruiz  NEUROLOGY  [address]  [place] [address]  Phone: [phone]  Fax: [phone]  Follow Up Time:

## 2020-11-10 NOTE — DISCHARGE NOTE PROVIDER - NSDCCAREPROVSEEN_GEN_ALL_CORE_FT
[facility] Medicine Team 3 [facility] Medicine Team 3  Adrianne Desai [facility] Medicine Team 3  Adrianne Desai, Keven

## 2020-11-10 NOTE — CONSULT NOTE ADULT - PROBLEM SELECTOR RECOMMENDATION 9
- Floxin drops 10 gtt QD x10 days in left ear  - Pt should f/u with ENT as outpatient, may see Dr Hillman/Mac/Jaxson. Call [phone] Recommend soft diet  Warm compresses to left side of face  If pt. can tolerate, NSAIDs for pain

## 2020-11-10 NOTE — DIETITIAN INITIAL EVALUATION ADULT. - OBTAIN WEEKLY WEIGHT
Number Of Freeze-Thaw Cycles: 3 freeze-thaw cycles Duration Of Freeze Thaw-Cycle (Seconds): 2 Post-Care Instructions: I reviewed with the patient in detail post-care instructions. Patient is to wear sunprotection, and avoid picking at any of the treated lesions. Pt may apply Vaseline to crusted or scabbing areas. Consent: The patient's verbal consent was obtained including but not limited to risks of crusting, scabbing, blistering, scarring, darker or lighter pigmentary change, recurrence, incomplete removal and infection. Detail Level: Detailed Render In Bullet Format When Appropriate: No Total Number Of Aks Treated: 6 Render Post-Care Instructions In Note?: yes yes

## 2020-11-10 NOTE — DIETITIAN INITIAL EVALUATION ADULT. - ORAL INTAKE PTA/DIET HISTORY
Pt reports good appetite and PO intake at home. Confirms NKFA. Pt reports not following any type of diet or restriction at home. Pt reports not taking any vitamins or nutritional supplements PTA.

## 2020-11-10 NOTE — DIETITIAN INITIAL EVALUATION ADULT. - PROBLEM SELECTOR PLAN 3
MRI at [facility] in 08/2020:  1. 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   2. Non specific cerebral white matter lesions unchanged. Small vessel ischemic disease is not likely at this age unless there is an underlying vasculitis/vasculopathy. The findings are not suggestive of demyelinating disease"   "Small areas of signal abnormality in subcortical parietal regions".    As above

## 2020-11-10 NOTE — DISCHARGE NOTE PROVIDER - NSFOLLOWUPCLINICS_GEN_ALL_ED_FT
[facility] [place]  Internal Medicine  [address] [place] [address]  Phone: [phone]  Fax: [phone]  Follow Up Time: 2 weeks

## 2020-11-10 NOTE — PROGRESS NOTE ADULT - SUBJECTIVE AND OBJECTIVE BOX
PROGRESS NOTE:     CONTACT INFO:  Laura Quiroga MD | PGY-1  Pager: [phone] [place], [id number] [facility]  After 7pm page night float      Patient is a 24y old  Female who presents with a chief complaint of new onset stutter and R hand tremor (09 Nov 2020 16:32)      SUBJECTIVE / OVERNIGHT EVENTS:    - No acute events overnight.   - No fevers/chills.  - Patient seen and evaluated at bedside.  - Denies SOB at rest, chest pain, palpitations, abdominal pain, nausea/vomiting  -Mongolian Int [id number] for mother at bedside; pt and mother updated regarding clinical course. Explained that inpt workup complete.  - Pt w/ continued 7/10 HA on left, which spread to both sides  - Pt states she doesn't feel safe going home with HA of 7/10 severity, and stating it is unethical for team to send her home with HA of 7/10 and stating that writer might "get in trouble" for this.    ADDITIONAL REVIEW OF SYSTEMS:    MEDICATIONS  (STANDING):  lidocaine   Patch 2 Patch Transdermal daily  methylPREDNISolone sodium succinate Injectable 50 milliGRAM(s) IV Push two times a day  sodium chloride 0.9%. 1000 milliLiter(s) (100 mL/Hr) IV Continuous <Continuous>  verapamil  milliGRAM(s) Oral daily    MEDICATIONS  (PRN):  acetaminophen   Tablet .. 650 milliGRAM(s) Oral every 6 hours PRN Mild Pain (1 - 3), Moderate Pain (4 - 6)  ondansetron Injectable 4 milliGRAM(s) IV Push every 6 hours PRN Nausea and/or Vomiting      CAPILLARY BLOOD GLUCOSE        I&O's Summary    09 Nov 2020 07:01  -  10 Nov 2020 07:00  --------------------------------------------------------  IN: 598 mL / OUT: 0 mL / NET: 598 mL        PHYSICAL EXAM:  Vital Signs Last 24 Hrs  T(C): 36.7 (10 Nov 2020 04:25), Max: 37.1 (09 Nov 2020 20:30)  T(F): 98.1 (10 Nov 2020 04:25), Max: 98.7 (09 Nov 2020 20:30)  HR: 87 (10 Nov 2020 04:25) (84 - 93)  BP: 109/63 (10 Nov 2020 04:25) (109/63 - 123/75)  BP(mean): --  RR: 17 (10 Nov 2020 04:25) (17 - 18)  SpO2: 96% (10 Nov 2020 04:25) (96% - 96%)    CONSTITUTIONAL: NAD, well-developed  RESPIRATORY: Normal respiratory effort; lungs are clear to auscultation bilaterally  CARDIOVASCULAR: Regular rate and rhythm, normal S1 and S2, no murmur/rub/gallop; No lower extremity edema; Peripheral pulses are 2+ bilaterally  ABDOMEN: Nontender to palpation, normoactive bowel sounds, no rebound/guarding; No hepatosplenomegaly  PSYCH: A+O to person, place, and time; affect appropriate  NERVOUS SYSTEM:  Alert & Oriented X3, Good concentration; + stutter, CN II-VII intact (L shoulder shrug weak)  Strength: LUE 4/5 LLE 4/5, RUE and RLE 5/5  Sensory: intact sensation   Cerebellar: no dysmetria on FNF, no dysdiadokinesia, no abnlty on heel shin  PSYCH: aaox3, appropriate, pleasant, reporting normal mood normally though currently feeling frustrated d/t stutter; thought process linear, with good content  LABS:                        13.1   17.71 )-----------( 375      ( 10 Nov 2020 06:50 )             40.6     11-10    138  |  104  |  14  ----------------------------<  118<H>  4.1   |  20<L>  |  0.49<L>    Ca    9.4      10 Nov 2020 06:48  Phos  2.9     11-10  Mg     2.2     11-10    TPro  7.4  /  Alb  4.2  /  TBili  0.1<L>  /  DBili  x   /  AST  15  /  ALT  16  /  AlkPhos  96  11-10      CARDIAC MARKERS ( 09 Nov 2020 11:42 )  x     / x     / 43 U/L / x     / x              GI PCR Panel, Stool (collected 09 Nov 2020 21:07)  Source: .Stool Feces  Final Report (09 Nov 2020 22:53):    GI PCR Results: NOT detected    *******Please Note:*******    GI panel PCR evaluates for:    Campylobacter, Plesiomonas shigelloides, Salmonella,    Vibrio, Yersinia enterocolitica, Enteroaggregative    Escherichia coli (EAEC), Enteropathogenic E.coli (EPEC),    Enterotoxigenic E. coli (ETEC) lt/st, Shiga-like    toxin-producing E. coli (STEC) stx1/stx2,    Shigella/ Enteroinvasive E. coli (EIEC), Cryptosporidium,    Cyclospora cayetanensis, Entamoeba histolytica,    Giardia lamblia, Adenovirus F 40/41, Astrovirus,    Norovirus GI/GII, Rotavirus A, Sapovirus        RADIOLOGY & ADDITIONAL TESTS:  Results Reviewed:   Imaging Personally Reviewed:  Electrocardiogram Personally Reviewed:    COORDINATION OF CARE:  Care Discussed with Consultants/Other Providers [Y/N]: Y  Prior or Outpatient Records Reviewed [Y/N]: Y

## 2020-11-10 NOTE — DISCHARGE NOTE PROVIDER - NSDCMRMEDTOKEN_GEN_ALL_CORE_FT
amitriptyline 10 mg oral tablet: 1 tab(s) orally once a day   amitriptyline 10 mg oral tablet: 1 tab(s) orally once a day  speech therapy: speech therapy    for stuttering  ICD 10: R47.82   amitriptyline 10 mg oral tablet: 1 tab(s) orally once a day  phsical therapy: physical therapy    G81. 94, Hemiplegia  speech therapy: speech therapy    for stuttering  ICD 10: R47.82   diphenhydrAMINE 25 mg oral tablet: 1 tab(s) orally 2 times a day, As Needed -for headache   lidocaine 5% topical film: Apply topically to affected area once a day, As Needed  - apply for 12 hours on 12 horus off, do not overlap. For bilateral shouler/neck pain.   metoclopramide 10 mg oral tablet: 1 tab(s) orally 2 times a day x 5 days, As Needed -for headache   pantoprazole 40 mg oral delayed release tablet: 1 tab(s) orally once a day  phsical therapy: physical therapy    G81. 94, Hemiplegia  predniSONE 20 mg oral tablet: 2 tab(s) orally once a day   speech therapy: speech therapy    for stuttering  ICD 10: R47.82  verapamil 120 mg/12 hours oral tablet, extended release: 1 tab(s) orally once a day

## 2020-11-10 NOTE — PROGRESS NOTE ADULT - REASON FOR ADMISSION
new onset stutter and R hand tremor

## 2020-11-10 NOTE — PROGRESS NOTE ADULT - ATTENDING COMMENTS
# [id number] , patient states that early in AM she had headache rated as 7/10 on the bifrontal area with no radiation that is aggravated by noise and lack of sleep.  She states that the steroid has provided little relief.  The left uppper extremity weakness has resolved and she move her LUE to above her head, improved stutter and improved Rt hand tremor.  Pt states that the Benadryl and the Metoclopramide has helped her to sleep and was without pain when I saw her.  She now c/o B/L ear pain with L >RT with no decrease in hearing, no pain on pressure , but Mother states that patient noticed some secretion from the ears.  NO rash .  Since patient's headache has improved with the current regimen, she and her mother agree for dc home on short course of oral steroid with Prednisone 40mg oral daily for 5 days total , cont with Verapamil  mg oral daily , Benadryl 25mg oral every 12 hours and Metoclopromide for 5 days supply.  Patient is to follow up with PCP ( she states to have apt tomorrow with PCP ) , with neurosurgeon and with Neurologist ( Migraine Specialist, [facility]) .  Case was discussed with the Neurology resident with no ofurther intervention or testing planned by the neuro team.  Will dc once pt is seen by the ENT team.  DC time 46mns.        Adrianne Desai   HOspitalist   [phone]
Pt with improvement on steroid .  Cont current mgt .  MIght be dc if no issues.        Adrianne Desai   HOspitalist   
-Patient seen/examined on 11/7/20. Case/plan discussed with the resident as reviewed/edited by me above and in any comments below.  -Spoke with patient and her mom using  phone with resident at bedside.   -Patient reports left-sided head/neck and shoulder pain. She reports LLE weakness and left heel numbness. She is stuttering on exam.   -MRI reviewed. Left parietal abnormality on imaging. ?related to migraines.   -Neuro recs reviewed.   -?hemiplegic migraines. For today will treat as a classic migraine more aggressively with standing Toradol IV and standing sumatriptan and monitor response. Tomorrow if not improving, will treat as if a hemiplegic migraine with verapamil +/- steroids.  -If still no improvement thereafter, will need to seek further neuro input.
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  Patient denies any premonitory symptoms no fever , no chills, no sick contact , no recent travel, patient works at home with no known sick contact and no pets. Patient denies any urinary or bowel incontinence, no Illicit drug use, no visual or auditory hallucinations, no vision disturbance or neck pain.  In the ED patient was evaluated by the Neuro team and CT of head with no acute .  LP was done with WBC Of 1 and normal protein in CSF .  Pt was started on IV Acyclovir for the possibility of HSV , with neg CSF HSV PCR .  MRI of brain was done with non specific hyperintensity on the left Parietal lobe MRI of spine is pending.   EEG is pending.  Neurosurgery team was contacted and no need for consult and recommended Neuro follow up .  I have spoken with the neurology resident who states that the lesion is a common finding in patient with Migraine headache which can be anywhere in the brain and there is no further intervention from the neurology team.  Amitriptyline was placed on HOLD and if she has any acute migraine , might start triptan or fioricet.  Psych consult is called as Neuro team believes that her symptom could be sec to underlying undiagnosed psych conditions .   We will touch base with her Neurosurgeon.      Adrianne Desai   Hospitalist   [phone].
-Patient seen/examined on 11/8/20. Case/plan discussed with the intern and resident as reviewed/edited by me above and in any comments below.  -Patient still with left-sided head, neck, shoulder pain. Also, weakness in LLE and decreased sensation in left foot and persistent stutter.   -Patient did not respond to standard migraine treatment of standing NSAIDS and triptans. Also, unclear benefit of benzos recently added. -Will therefore stop these.   -If the patient does in fact have hemiplegic migraines, then the treatment would be to avoid triptans and vasoconstriction. The treatment would be verapamil and can also add IV solumedrol. Will start both today and monitor response. Monitor BG closely in IV steroids.   -Will also need to seek further neuro input to assist with diagnosis and management plan.   -Gave patient copies of her MRI and EEG reports. Updated her and her mother at bedside.   -Having diarrhea. Will send stool studies including GI PCR and stool culture and stool fat. -Check RVP.   -Will see if can check for oligoclonal bands in CSF.
patient is seen with mother at the bedside.  Patient still has the stutter or repetitive words at the beginning of her sentence with fluency afterward.  She has good word comprehension , can formulate word once she is past the repetitive words.  Neuro exam / please see above , the RT hand tremor is distractable , left upper extremity movement is mostly limited with Left shoulder pain and can move the LUE against some resistance.   I have reviewed the [employer] and was unable to find any MRI or CT of brain , we are waiting on record from outside hospital .  I have noted that she has been having Left shoulder pain dated about >years ago.  Will get Xrays of Lt upper extremities .  MRI of brain is done with reported  signal hyperintensity on the left subcortical white matter at the left Parietal lobe .  CSF neg for HSV . Acyclovir was dc but with the parietal white matter intensity identified , I will restart the Acyclovir , get ID eval and Neuro team was called at [phone] with no response , will contact again.  Awaiting on record from outside hospital .  MIght get Neurosurgery evaluation as well.      Adrianne Desai   Hospitalist   [phone]

## 2020-11-10 NOTE — DIETITIAN INITIAL EVALUATION ADULT. - OTHER INFO
Pt reports good appetite and PO intake in house. Noted 50-75% PO intake as per flow sheets. Mom at bedside reports pt consuming 100% of meals. Pt denies difficulty chewing/swallowing soft diet. Reports nausea and vomiting 2 days ago due to medications - denies further episodes at this time. Reports mild constipation with last BM yesterday (11/09) - requests prune juice.     Provided recommendations to help with constipation, encouraged fruits and vegetables, whole grains, and good hydration. Pt and mom deny having further questions/concerns about diet and nutrition; refused education - made aware RD remains available.

## 2020-11-10 NOTE — DIETITIAN INITIAL EVALUATION ADULT. - REASON FOR ADMISSION
Pt 25 y/o F with PMH: migraines, gastritis, pituitary lesion and nonspecific subcortical parietal white matter lesions, who presented as a code stroke for sudden new onset stutter and R hand tremor; S/P swallow evaluation (11/07) recommending soft diet.

## 2020-11-10 NOTE — DIETITIAN INITIAL EVALUATION ADULT. - CONTINUE CURRENT NUTRITION CARE PLAN
yes/Recommend continue soft diet. Will continue to monitor and adjust as needed. Defer diet/fluid consistencies to medical team/SLP recommendations.

## 2020-11-10 NOTE — DISCHARGE NOTE PROVIDER - NSDCCPCAREPLAN_GEN_ALL_CORE_FT
PRINCIPAL DISCHARGE DIAGNOSIS  Diagnosis: Stutter  Assessment and Plan of Treatment: You came in with headache, stutter, and tremor. We did extensive workup including spinal tap (lumbar puncture), brain and spine imaging including CT Head, CT angiography of Head, MRI of Brain, MRI of total spine, MR Spect of Brain. The neurologists, psychiatrists, speech therapists, physical therapists, and infectious disease doctors saw you. There was no evidence of infection or disease such as multiple sclerosis. The cause of your stutter was unclear. Though there were some lesion seen in the left parietal lobe, this was likely similar to the lesion seen at [facility] prior to your new stutter and other symptoms.  On additional imaging there was a small lesion in the right parietal lobe. These lesions can be seen in patients with migraine. You were given various treatments with variable improvement. Follow up with Dr. Ruiz, a neurologist who specializes in migraine. Follow up with your neurologist and neurosurgeon at [facility] as well. The speech therapists gave you the contact information for speech therapy, we encourage you to participate in speech therapy. Also continue with physical therapy at home.      SECONDARY DISCHARGE DIAGNOSES  Diagnosis: Fever  Assessment and Plan of Treatment:      PRINCIPAL DISCHARGE DIAGNOSIS  Diagnosis: Stutter  Assessment and Plan of Treatment: You came in with headache, stutter, and tremor. We did extensive workup including spinal tap (lumbar puncture), brain and spine imaging including CT Head, CT angiography of Head, MRI of Brain, MRI of total spine, MR Spect of Brain. The neurologists, psychiatrists, speech therapists, physical therapists, and infectious disease doctors saw you. There was no evidence of infection or disease such as multiple sclerosis. The cause of your stutter was unclear. Though there were some lesion seen in the left parietal lobe, this was likely similar to the lesion seen at [facility] prior to your new stutter and other symptoms.  On additional imaging there was a small lesion in the right parietal lobe. These lesions can be seen in patients with migraine. You were given various treatments with variable improvement. Follow up with Dr. Ruiz, a neurologist who specializes in migraine. Follow up with your neurologist and neurosurgeon at [facility] as well. The speech therapists gave you the contact information for speech therapy, we encourage you to participate in speech therapy. Also continue with physical therapy at home.      SECONDARY DISCHARGE DIAGNOSES  Diagnosis: Ear pain  Assessment and Plan of Treatment: You had ear pain. The otolaryngologist evaluated you. Try warm compresses to both side to relieve discomfort. Follow up with your primary care doctor.

## 2020-11-10 NOTE — PROGRESS NOTE ADULT - ASSESSMENT
23 yo F w/ hx of migraine (on Amitriptyline), pituitary lesion and nonspecific subcortical parietal white matter lesions  seen on MRI Head at [facility] in 08/2020 ,who presents as a code stroke for sudden onset stutter and R hand tremor. On adm w/ fever Tmax 100.4 x1, no leukocytosis or electrolyte abnlties. CTH and CTA wnl in ED;  LP unrevealing. MRI brain showing nonspecific focus of T2/flair signal hyperintensity within the L parietal  lobe, likely the same lesions seen in August prior to onset of stutter. MRI total spine unremarkable, except for low marrow signal which may be related to anemia. Remains with stutter, HA, L-sided weakness, ?hemiplegic migraine, but neuro feels not neurogenic.    Ddx 2/2 psychogenic stutter, vs. seizure (post ictal)  vs other structural CNS lesion vs conversion disorder

## 2020-11-11 LAB
AMPHET UR-MCNC: NEGATIVE — SIGNIFICANT CHANGE UP
AMPHET UR-MCNC: NEGATIVE — SIGNIFICANT CHANGE UP
ANA TITR SER: NEGATIVE — SIGNIFICANT CHANGE UP
BARBITURATES, URINE.: NEGATIVE — SIGNIFICANT CHANGE UP
BARBITURATES, URINE.: NEGATIVE — SIGNIFICANT CHANGE UP
BENZODIAZ UR-MCNC: NEGATIVE — SIGNIFICANT CHANGE UP
BENZODIAZ UR-MCNC: NEGATIVE — SIGNIFICANT CHANGE UP
COCAINE METAB.OTHER UR-MCNC: NEGATIVE — SIGNIFICANT CHANGE UP
COCAINE METAB.OTHER UR-MCNC: NEGATIVE — SIGNIFICANT CHANGE UP
CREATININE, URINE THERAPEUTIC: 42.3 MG/DL — SIGNIFICANT CHANGE UP
CREATININE, URINE THERAPEUTIC: 62 MG/DL — SIGNIFICANT CHANGE UP
CULTURE RESULTS: SIGNIFICANT CHANGE UP
METHADONE UR-MCNC: NEGATIVE — SIGNIFICANT CHANGE UP
METHADONE UR-MCNC: NEGATIVE — SIGNIFICANT CHANGE UP
METHAQUALONE UR QL: NEGATIVE — SIGNIFICANT CHANGE UP
METHAQUALONE UR QL: NEGATIVE — SIGNIFICANT CHANGE UP
METHAQUALONE UR-MCNC: NEGATIVE — SIGNIFICANT CHANGE UP
METHAQUALONE UR-MCNC: NEGATIVE — SIGNIFICANT CHANGE UP
OPIATES UR-MCNC: NEGATIVE — SIGNIFICANT CHANGE UP
OPIATES UR-MCNC: NEGATIVE — SIGNIFICANT CHANGE UP
PCP UR-MCNC: NEGATIVE — SIGNIFICANT CHANGE UP
PCP UR-MCNC: NEGATIVE — SIGNIFICANT CHANGE UP
PROPOXYPH UR QL: NEGATIVE — SIGNIFICANT CHANGE UP
PROPOXYPH UR QL: NEGATIVE — SIGNIFICANT CHANGE UP
SPECIMEN SOURCE: SIGNIFICANT CHANGE UP
THC UR QL: NEGATIVE — SIGNIFICANT CHANGE UP
THC UR QL: NEGATIVE — SIGNIFICANT CHANGE UP
VOLTAGE-GATED K CHANNEL AB RESULT: 7 PMOL/L — SIGNIFICANT CHANGE UP (ref 0–31)

## 2020-11-12 LAB
ACHR MOD AB SER-ACNC: 4 — SIGNIFICANT CHANGE UP
FAT STL QN: NORMAL — SIGNIFICANT CHANGE UP
FAT STL QN: NORMAL — SIGNIFICANT CHANGE UP
VGCC-P/Q BIND AB SER-SCNC: 33 PMOL/L — HIGH (ref 0–24.5)

## 2020-11-13 LAB — ACHR BLOCK AB SER-ACNC: <15 — SIGNIFICANT CHANGE UP

## 2020-11-14 ENCOUNTER — EMERGENCY (EMERGENCY)
Facility: HOSPITAL | Age: 25
LOS: 1 days | Discharge: HOME | End: 2020-11-14
Attending: EMERGENCY MEDICINE
Payer: COMMERCIAL

## 2020-11-14 VITALS
TEMPERATURE: 98 F | HEIGHT: 61 IN | HEART RATE: 95 BPM | DIASTOLIC BLOOD PRESSURE: 84 MMHG | RESPIRATION RATE: 18 BRPM | WEIGHT: 212.08 LBS | OXYGEN SATURATION: 100 % | SYSTOLIC BLOOD PRESSURE: 119 MMHG

## 2020-11-14 VITALS
DIASTOLIC BLOOD PRESSURE: 73 MMHG | SYSTOLIC BLOOD PRESSURE: 106 MMHG | OXYGEN SATURATION: 100 % | RESPIRATION RATE: 18 BRPM | HEART RATE: 82 BPM

## 2020-11-14 PROBLEM — G43.909 MIGRAINE, UNSPECIFIED, NOT INTRACTABLE, WITHOUT STATUS MIGRAINOSUS: Chronic | Status: ACTIVE | Noted: 2020-11-04

## 2020-11-14 LAB
ALBUMIN SERPL ELPH-MCNC: 3.6 G/DL — SIGNIFICANT CHANGE UP (ref 3.3–5)
ALP SERPL-CCNC: 81 U/L — SIGNIFICANT CHANGE UP (ref 40–120)
ALT FLD-CCNC: 16 U/L — SIGNIFICANT CHANGE UP (ref 10–45)
ANION GAP SERPL CALC-SCNC: 10 MMOL/L — SIGNIFICANT CHANGE UP (ref 5–17)
APPEARANCE UR: ABNORMAL
AST SERPL-CCNC: 17 U/L — SIGNIFICANT CHANGE UP (ref 10–40)
BACTERIA # UR AUTO: NEGATIVE — SIGNIFICANT CHANGE UP
BASOPHILS # BLD AUTO: 0.04 K/UL — SIGNIFICANT CHANGE UP (ref 0–0.2)
BASOPHILS NFR BLD AUTO: 0.3 % — SIGNIFICANT CHANGE UP (ref 0–2)
BILIRUB SERPL-MCNC: 0.2 MG/DL — SIGNIFICANT CHANGE UP (ref 0.2–1.2)
BILIRUB UR-MCNC: NEGATIVE — SIGNIFICANT CHANGE UP
BUN SERPL-MCNC: 14 MG/DL — SIGNIFICANT CHANGE UP (ref 7–23)
CALCIUM SERPL-MCNC: 8.5 MG/DL — SIGNIFICANT CHANGE UP (ref 8.4–10.5)
CHLORIDE SERPL-SCNC: 105 MMOL/L — SIGNIFICANT CHANGE UP (ref 96–108)
CO2 SERPL-SCNC: 23 MMOL/L — SIGNIFICANT CHANGE UP (ref 22–31)
COLOR SPEC: COLORLESS — SIGNIFICANT CHANGE UP
CREAT SERPL-MCNC: 0.63 MG/DL — SIGNIFICANT CHANGE UP (ref 0.5–1.3)
DIFF PNL FLD: NEGATIVE — SIGNIFICANT CHANGE UP
EOSINOPHIL # BLD AUTO: 0.11 K/UL — SIGNIFICANT CHANGE UP (ref 0–0.5)
EOSINOPHIL NFR BLD AUTO: 0.8 % — SIGNIFICANT CHANGE UP (ref 0–6)
EPI CELLS # UR: 11 /HPF — HIGH
GLUCOSE SERPL-MCNC: 88 MG/DL — SIGNIFICANT CHANGE UP (ref 70–99)
GLUCOSE UR QL: NEGATIVE — SIGNIFICANT CHANGE UP
HCG UR QL: NEGATIVE — SIGNIFICANT CHANGE UP
HCT VFR BLD CALC: 40.5 % — SIGNIFICANT CHANGE UP (ref 34.5–45)
HGB BLD-MCNC: 13.1 G/DL — SIGNIFICANT CHANGE UP (ref 11.5–15.5)
HYALINE CASTS # UR AUTO: 2 /LPF — SIGNIFICANT CHANGE UP (ref 0–2)
IMM GRANULOCYTES NFR BLD AUTO: 1.9 % — HIGH (ref 0–1.5)
KETONES UR-MCNC: NEGATIVE — SIGNIFICANT CHANGE UP
LEUKOCYTE ESTERASE UR-ACNC: ABNORMAL
LYMPHOCYTES # BLD AUTO: 36.1 % — SIGNIFICANT CHANGE UP (ref 13–44)
LYMPHOCYTES # BLD AUTO: 4.73 K/UL — HIGH (ref 1–3.3)
MCHC RBC-ENTMCNC: 28.3 PG — SIGNIFICANT CHANGE UP (ref 27–34)
MCHC RBC-ENTMCNC: 32.3 GM/DL — SIGNIFICANT CHANGE UP (ref 32–36)
MCV RBC AUTO: 87.5 FL — SIGNIFICANT CHANGE UP (ref 80–100)
MONOCYTES # BLD AUTO: 0.97 K/UL — HIGH (ref 0–0.9)
MONOCYTES NFR BLD AUTO: 7.4 % — SIGNIFICANT CHANGE UP (ref 2–14)
NEUTROPHILS # BLD AUTO: 7.01 K/UL — SIGNIFICANT CHANGE UP (ref 1.8–7.4)
NEUTROPHILS NFR BLD AUTO: 53.5 % — SIGNIFICANT CHANGE UP (ref 43–77)
NITRITE UR-MCNC: NEGATIVE — SIGNIFICANT CHANGE UP
NRBC # BLD: 0 /100 WBCS — SIGNIFICANT CHANGE UP (ref 0–0)
PH UR: 6 — SIGNIFICANT CHANGE UP (ref 5–8)
PLATELET # BLD AUTO: 313 K/UL — SIGNIFICANT CHANGE UP (ref 150–400)
POTASSIUM SERPL-MCNC: 4.1 MMOL/L — SIGNIFICANT CHANGE UP (ref 3.5–5.3)
POTASSIUM SERPL-SCNC: 4.1 MMOL/L — SIGNIFICANT CHANGE UP (ref 3.5–5.3)
PROT SERPL-MCNC: 6.2 G/DL — SIGNIFICANT CHANGE UP (ref 6–8.3)
PROT UR-MCNC: NEGATIVE — SIGNIFICANT CHANGE UP
RBC # BLD: 4.63 M/UL — SIGNIFICANT CHANGE UP (ref 3.8–5.2)
RBC # FLD: 13.2 % — SIGNIFICANT CHANGE UP (ref 10.3–14.5)
RBC CASTS # UR COMP ASSIST: 1 /HPF — SIGNIFICANT CHANGE UP (ref 0–4)
SARS-COV-2 RNA SPEC QL NAA+PROBE: SIGNIFICANT CHANGE UP
SODIUM SERPL-SCNC: 138 MMOL/L — SIGNIFICANT CHANGE UP (ref 135–145)
SP GR SPEC: 1.01 — SIGNIFICANT CHANGE UP (ref 1.01–1.02)
UROBILINOGEN FLD QL: NEGATIVE — SIGNIFICANT CHANGE UP
WBC # BLD: 13.11 K/UL — HIGH (ref 3.8–10.5)
WBC # FLD AUTO: 13.11 K/UL — HIGH (ref 3.8–10.5)
WBC UR QL: 16 /HPF — HIGH (ref 0–5)

## 2020-11-14 PROCEDURE — 85025 COMPLETE CBC W/AUTO DIFF WBC: CPT

## 2020-11-14 PROCEDURE — 81001 URINALYSIS AUTO W/SCOPE: CPT

## 2020-11-14 PROCEDURE — 99284 EMERGENCY DEPT VISIT MOD MDM: CPT

## 2020-11-14 PROCEDURE — 36415 COLL VENOUS BLD VENIPUNCTURE: CPT

## 2020-11-14 PROCEDURE — 80053 COMPREHEN METABOLIC PANEL: CPT

## 2020-11-14 PROCEDURE — 93010 ELECTROCARDIOGRAM REPORT: CPT

## 2020-11-14 PROCEDURE — U0003: CPT

## 2020-11-14 PROCEDURE — 81025 URINE PREGNANCY TEST: CPT

## 2020-11-14 PROCEDURE — 87086 URINE CULTURE/COLONY COUNT: CPT

## 2020-11-14 PROCEDURE — 99284 EMERGENCY DEPT VISIT MOD MDM: CPT | Mod: 25

## 2020-11-14 PROCEDURE — 83690 ASSAY OF LIPASE: CPT

## 2020-11-14 PROCEDURE — 93005 ELECTROCARDIOGRAM TRACING: CPT

## 2020-11-14 RX ORDER — SODIUM CHLORIDE 9 MG/ML
1000 INJECTION INTRAMUSCULAR; INTRAVENOUS; SUBCUTANEOUS ONCE
Refills: 0 | Status: COMPLETED | OUTPATIENT
Start: 2020-11-14 | End: 2020-11-14

## 2020-11-14 RX ORDER — IBUPROFEN 200 MG
400 TABLET ORAL ONCE
Refills: 0 | Status: COMPLETED | OUTPATIENT
Start: 2020-11-14 | End: 2020-11-14

## 2020-11-14 RX ORDER — ACETAMINOPHEN 500 MG
975 TABLET ORAL ONCE
Refills: 0 | Status: COMPLETED | OUTPATIENT
Start: 2020-11-14 | End: 2020-11-14

## 2020-11-14 RX ORDER — CYCLOBENZAPRINE HYDROCHLORIDE 10 MG/1
5 TABLET, FILM COATED ORAL ONCE
Refills: 0 | Status: COMPLETED | OUTPATIENT
Start: 2020-11-14 | End: 2020-11-14

## 2020-11-14 RX ORDER — DIAZEPAM 5 MG
1 TABLET ORAL
Qty: 9 | Refills: 0
Start: 2020-11-14 | End: 2020-11-16

## 2020-11-14 RX ADMIN — Medication 975 MILLIGRAM(S): at 09:24

## 2020-11-14 RX ADMIN — Medication 400 MILLIGRAM(S): at 09:24

## 2020-11-14 RX ADMIN — CYCLOBENZAPRINE HYDROCHLORIDE 5 MILLIGRAM(S): 10 TABLET, FILM COATED ORAL at 10:26

## 2020-11-14 RX ADMIN — SODIUM CHLORIDE 1000 MILLILITER(S): 9 INJECTION INTRAMUSCULAR; INTRAVENOUS; SUBCUTANEOUS at 09:25

## 2020-11-14 NOTE — ED PROVIDER NOTE - OBJECTIVE STATEMENT
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 Patient denies fever. No nausea, vomiting, photophobia. No trauma or falls. Believes the medications she was discharged on (Benadryl, lidocaine patches, reglan) has made her heart race so has stopped taking them.

## 2020-11-14 NOTE — ED PROVIDER NOTE - NS ED ROS FT
GENERAL: no fever, chills, fatigue, weight loss, night sweats  HEENT: no eye pain, discharge, conjunctivitis, ear pain, hearing loss, rhinorrhea, congestion, throat pain  CARDIAC: + chest pain  PULM: no dyspnea, wheezing  GI: + abdominal pain, no nausea, vomiting, diarrhea, constipation, melena, hematochezia  : no urinary dysuria, frequency, incontinence, hematuria  NEURO: + stuttering speech  MSK: + neck pain, shoulder pain  SKIN: no rashes  HEME: no active bleeding, excessive bruising

## 2020-11-14 NOTE — ED PROVIDER NOTE - CLINICAL SUMMARY MEDICAL DECISION MAKING FREE TEXT BOX
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 On exam, non-toxic appearance, but uncomfortable, neck stiffness, mildly tachycardic to low 100s (sinus tach), stuttering speech, TMs clear bilaterally, neuro exam otherwise non-focal, ttp in chest wall, ttp in epigastrium, but no rebound or guarding. Concerning for persistent encephalitis/meningitis vs MSK pain vs migraines. Will treat symptomatically, consult neuro, obtain basic labs, disposition pending work-up and response to treatment.

## 2020-11-14 NOTE — CONSULT NOTE ADULT - SUBJECTIVE AND OBJECTIVE BOX
HPI:  Shira Marie is a 24 year old female with a past medical history of migraines, pituitary lesion, gastritis presents with a headache and neck pain. She was recently admitted at this institution for a R hand tremor and new onset stutter. Imaging perfomed during last admission included an MRI brain which noted a left parietal lobe hyperintensity, MR of the C/T/L spine which did not note any abnormalities, MR spect brain which continued to note a 5mm hyperintensity in the left parietal lobe which had normal CIERRA to creatine and choline to creatinine ratios which were not supportive of neoplasm. EEG was performed which did not note any seizures. 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 Did have indeterminate range VGCC antibody. She was seen by neurology who deemed the lesion likely in the context of her migraines. She was recommended for speech therapy in the outpatient setting and was recommended to be discharged on verapamil, steroid taper, Reglan, Benadryl, and lidocaine patches. She states that since her discharge she feels that she has continued to have the same kind of headaches and they have only abated intermittently. She reports that she does not like the lidocaine patch and that she feels that all of the medications seem to make her feel palpitations. Endorses pain upon flexing her neck or moving her neck to the left or right. Notes she feels tension down her neck the radiates to her shoulders. She denies any fevers, chills, sick contacts after discharge. States that her headaches continues to be more of a pulsating headache and sharp that causes her to feel tingling and sometimes numbness on her L side more so on the face and arm. States she does occasionally feel weak on the left side but currently denies pain. Does endorse sensitivity to light and sounds. Denies recent head trauma.     REVIEW OF SYSTEMS    A 10-system ROS was performed and is negative except for those items noted above and/or in the HPI.    PAST MEDICAL & SURGICAL HISTORY:  Migraine    Gastritis    Metatarsal bone fracture    No significant past surgical history    ALLERGIES/INTOLERANCES:  Allergies  No Known Allergies    Intolerances    VITALS & EXAMINATION:  Vital Signs Last 24 Hrs  T(C): 37.3 (14 Nov 2020 08:17), Max: 37.3 (14 Nov 2020 08:17)  T(F): 99.1 (14 Nov 2020 08:17), Max: 99.1 (14 Nov 2020 08:17)  HR: 87 (14 Nov 2020 08:17) (87 - 95)  BP: 113/77 (14 Nov 2020 08:17) (113/77 - 119/84)  BP(mean): --  RR: 19 (14 Nov 2020 08:17) (18 - 19)  SpO2: 100% (14 Nov 2020 08:17) (100% - 100%)    General:  Constitutional: Appears stated age, in mild acute distress.   Head: Normocephalic & atraumatic.  Skin: No rashes, bruising, or discoloration.    Neurological (>12):  MS: Awake, alert, oriented to person, place, situation, time. Normal affect. Follows all commands.  Neck: pain to flexion and extension    Language: Speech is stuttering speech but able to be understood. Occasionally stutter breaks and patient is able to enunciate several words without stutter. Stutter reduces when distracting tasks are performed. Able to repeat and comprehension.    CNs: PERRLA (R = 4mm, L = 4mm). Extreme photophobia when light shone in the eyes. VFF. EOMI no nystagmus. V1-3 reduced to light touch and vibratory sense. Well developed masseter muscles b/l. No facial asymmetry b/l. Symmetric palate elevation in midline. Head turning & shoulder shrug intact b/l. Tongue midline, normal movements, no atrophy.    Motor: Normal muscle bulk & tone. No noticeable tremor or seizure. No pronator drift.              Deltoid	Biceps	Triceps	Wrist	   R	5	5	5	5	5-		  L	5	5	5	5	5-    	H-Flex	H-Ext	K-Flex	K-Ext	D-Flex	P-Flex  R	5	5	5	5	5	5		   L	5	5	5	5	5	5	     Sensation: Intact to LT b/l throughout.     Reflexes:              Biceps(C5)       BR(C6)     Triceps(C7)               Patellar(L4)    Achilles(S1)    Plantar Resp  R	2	          2	             2		        2		    2		Down   L	2	          2	             2		        2		    2		Down     Coordination: No dysmetria to FTN/HTS    Gait: Deferred.    LABORATORY:    STUDIES & IMAGING:  Studies (EKG, EEG, EMG, etc):     11/6 EEG    EEG SUMMARY/CLASSIFICATION    Normal EEG in the awake, drowsy and asleep states  diffuse beta activity  _____________________________________________________________  EEG IMPRESSION/CLINICAL CORRELATE    Diffuse beta activity, which may be normal variant, though typically seen with medication use such as benzodiazepines or barbiturates.    Radiology (XR, CT, MR, U/S, TTE/WILBUR):      < from: MR Spectroscopy (11.08.20 @ 22:06) >  IMPRESSION:    Similar-appearing nonspecific 5 mm focus of increased signal intensity on FLAIR images within the left parietal subcortical white matter.    Nonspecific 3 mm focus of increased signal intensity on FLAIR images within the right parietal subcortical white matter was not well appreciated on the prior examination.    Single voxel spectroscopy of the left parietal subcortical lesion demonstrates similar CIERRA to creatine and choline to creatine ratios compared to the presumably normal contralateral side. This does not support the presence of neoplasm. However, because of the small size of the left parietal white matter lesion, a large volume of normal brain tissue was included in the lesional voxel.    < end of copied text >    < from: MR Cervical Spine w/wo IV Cont (11.06.20 @ 10:44) >  IMPRESSION: Unremarkable MRI of the cervical, thoracic, and lumbosacral spine except for low marrow signal which may be related to anemia. No masses or abnormal enhancement. No abnormal cord signal.    < end of copied text >    < from: MR Head w/wo IV Cont (11.05.20 @ 09:38) >  FINDINGS:  There is no intraparenchymal hematoma, mass effect or midline shift. No abnormal extra-axial fluid collections are present.    Age-appropriate ventricular size. No hydrocephalus. Basal cisterns are patent.    There is a focus T2/FLAIR signal hyperintensity within the left subcortical white matter at the level of the left parietal lobe (6, 16).    There is no diffusion abnormality to suggest acute or subacute infarction. Major flow-voids at the base ofthe brain follow expected course and contour.    The calvarium is intact. The visualized intraorbital compartments are within normal limits. Mucosal inclusion cyst versus polyps in bilateral maxillary sinuses. Bilateral ethmoid sinus thickening.    IMPRESSION:  Nonspecific focus of T2/FLAIR signal hyperintensity within the left parietal lobe on the images referenced above. This may represent a focus of infection inflammation demyelination or ischemia. Clinical correlation recommende    < end of copied text >   HPI:  Shira Marie is a 24 year old female with a past medical history of migraines, pituitary lesion, gastritis presents with a headache and neck pain. She was recently admitted at this institution for a R hand tremor and new onset stutter. Imaging perfomed during last admission included an MRI brain which noted a left parietal lobe hyperintensity, MR of the C/T/L spine which did not note any abnormalities, MR spect brain which continued to note a 5mm hyperintensity in the left parietal lobe which had normal CIERRA to creatine and choline to creatinine ratios which were not supportive of neoplasm. EEG was performed which did not note any seizures. 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 Did have indeterminate range VGCC antibody. She was seen by neurology who deemed the lesion likely in the context of her migraines. She was recommended for speech therapy in the outpatient setting and was recommended to be discharged on verapamil, steroid taper, Reglan, Benadryl, and lidocaine patches. She states that since her discharge she feels that she has continued to have the same kind of headaches and they have only abated intermittently. She reports that she does not like the lidocaine patch and that she feels that all of the medications seem to make her feel palpitations. Endorses pain upon flexing her neck or moving her neck to the left or right. Notes she feels tension down her neck the radiates to her shoulders. She denies any fevers, chills, sick contacts after discharge. States that her headaches continues to be more of a pulsating headache and sharp that causes her to feel tingling and sometimes numbness on her L side more so on the face and arm. States she does occasionally feel weak on the left side but currently denies pain. Does endorse sensitivity to light and sounds. Denies recent head trauma.     REVIEW OF SYSTEMS    A 10-system ROS was performed and is negative except for those items noted above and/or in the HPI.    PAST MEDICAL & SURGICAL HISTORY:  Migraine    Gastritis    Metatarsal bone fracture    No significant past surgical history    ALLERGIES/INTOLERANCES:  Allergies  No Known Allergies    Intolerances    VITALS & EXAMINATION:  Vital Signs Last 24 Hrs  T(C): 37.3 (14 Nov 2020 08:17), Max: 37.3 (14 Nov 2020 08:17)  T(F): 99.1 (14 Nov 2020 08:17), Max: 99.1 (14 Nov 2020 08:17)  HR: 87 (14 Nov 2020 08:17) (87 - 95)  BP: 113/77 (14 Nov 2020 08:17) (113/77 - 119/84)  BP(mean): --  RR: 19 (14 Nov 2020 08:17) (18 - 19)  SpO2: 100% (14 Nov 2020 08:17) (100% - 100%)    General:  Constitutional: Appears stated age, in mild acute distress.   Head: Normocephalic & atraumatic.  Skin: No rashes, bruising, or discoloration.    Neurological (>12):  MS: Awake, alert, oriented to person, place, situation, time. Normal affect. Follows all commands.  Neck: pain to flexion and extension    Language: Speech is stuttering speech but able to be understood. Occasionally stutter breaks and patient is able to enunciate several words without stutter. Stutter reduces when distracting tasks are performed. Able to repeat and comprehension.    CNs: PERRLA (R = 4mm, L = 4mm). Extreme photophobia when light shone in the eyes. VFF. EOMI no nystagmus. V1-3 reduced to light touch and vibratory sense. Well developed masseter muscles b/l. No facial asymmetry b/l. Symmetric palate elevation in midline. Head turning & shoulder shrug intact b/l. Tongue midline, normal movements, no atrophy.    Motor: Normal muscle bulk & tone. No noticeable tremor or seizure. No pronator drift.              Deltoid	Biceps	Triceps	Wrist	   R	5	5	5	5	5-		  L	5	5	5	5	5-    	H-Flex	H-Ext	K-Flex	K-Ext	D-Flex	P-Flex  R	5	5	5	5	5	5		   L	5	5	5	5	5	5	     Sensation: Reduced to LT on the L side in the UE. Equal sensation on the bilateral lower extremities    Reflexes:              Biceps(C5)       BR(C6)     Triceps(C7)               Patellar(L4)    Achilles(S1)    Plantar Resp  R	2	          2	             2		        2		    2		Down   L	2	          2	             2		        2		    2		Down     Coordination: No dysmetria to FTN/HTS    Gait: Deferred.    LABORATORY:    STUDIES & IMAGING:  Studies (EKG, EEG, EMG, etc):     11/6 EEG    EEG SUMMARY/CLASSIFICATION    Normal EEG in the awake, drowsy and asleep states  diffuse beta activity  _____________________________________________________________  EEG IMPRESSION/CLINICAL CORRELATE    Diffuse beta activity, which may be normal variant, though typically seen with medication use such as benzodiazepines or barbiturates.    Radiology (XR, CT, MR, U/S, TTE/WILBUR):      < from: MR Spectroscopy (11.08.20 @ 22:06) >  IMPRESSION:    Similar-appearing nonspecific 5 mm focus of increased signal intensity on FLAIR images within the left parietal subcortical white matter.    Nonspecific 3 mm focus of increased signal intensity on FLAIR images within the right parietal subcortical white matter was not well appreciated on the prior examination.    Single voxel spectroscopy of the left parietal subcortical lesion demonstrates similar CIERRA to creatine and choline to creatine ratios compared to the presumably normal contralateral side. This does not support the presence of neoplasm. However, because of the small size of the left parietal white matter lesion, a large volume of normal brain tissue was included in the lesional voxel.    < end of copied text >    < from: MR Cervical Spine w/wo IV Cont (11.06.20 @ 10:44) >  IMPRESSION: Unremarkable MRI of the cervical, thoracic, and lumbosacral spine except for low marrow signal which may be related to anemia. No masses or abnormal enhancement. No abnormal cord signal.    < end of copied text >    < from: MR Head w/wo IV Cont (11.05.20 @ 09:38) >  FINDINGS:  There is no intraparenchymal hematoma, mass effect or midline shift. No abnormal extra-axial fluid collections are present.    Age-appropriate ventricular size. No hydrocephalus. Basal cisterns are patent.    There is a focus T2/FLAIR signal hyperintensity within the left subcortical white matter at the level of the left parietal lobe (6, 16).    There is no diffusion abnormality to suggest acute or subacute infarction. Major flow-voids at the base ofthe brain follow expected course and contour.    The calvarium is intact. The visualized intraorbital compartments are within normal limits. Mucosal inclusion cyst versus polyps in bilateral maxillary sinuses. Bilateral ethmoid sinus thickening.    IMPRESSION:  Nonspecific focus of T2/FLAIR signal hyperintensity within the left parietal lobe on the images referenced above. This may represent a focus of infection inflammation demyelination or ischemia. Clinical correlation recommende    < end of copied text >

## 2020-11-14 NOTE — ED ADULT NURSE NOTE - OBJECTIVE STATEMENT
Patient is a 24 yr old female who presents to the ER from home via EMS complaining of neck pain/stutter/headache. Per patient she was just d/c from hospital 3 days ago and had a full work up then where they had diagnosed her with migraines. Patient is coming back with stutter/tremmors/neck stiffness. Patient states she feels she has trouble getting the words out and feels that her heart is racing sometimes with some CP. Upon assessment, patient is AOX4, upper extremity tremors, afebrile, lung sounds clear upon ausculation, abdomen soft/tender to palpation/+bowel sounds all 4 quadrants, skin intact, neck tenderness, +pulses and denies n/v/d/chills/SOB/covid contact. Provider assessed at bedside Patient is a 24 yr old female who presents to the ER from home via EMS complaining of neck pain/stutter/headache. Per patient she was just d/c from hospital 3 days ago and had a full work up then where they had diagnosed her with migraines. Patient is coming back with stutter/tremmors/neck stiffness. Patient states she feels she has trouble getting the words out and feels that her heart is racing sometimes with some CP. Upon assessment, patient is AOX4, upper extremity tremors, afebrile, lung sounds clear upon ausculation, abdomen soft/tender to palpation/+bowel sounds all 4 quadrants, skin intact, neck tenderness, +pulses and denies n/v/d/chills/SOB/covid contact. Provider assessed at bedside, heplock placed with labs drawn/sent, meds ordered given, call bell at bedside and will continue to monitor.

## 2020-11-14 NOTE — ED PROVIDER NOTE - ATTENDING CONTRIBUTION TO CARE
attending Randall: 24yF h/o migraines, recent admission for new onset fever/headache/stutter/right hand tremor found to have West Nile IgG in the CSF and an nonspecific focus in L parietal lobe on MRI here with persistent neck pain, and diffuse body aches. Has appt with neurologist in 2 days. On exam, non-toxic appearance, but uncomfortable, neck stiffness, mildly tachycardic to low 100s (sinus tach), stuttering speech, TMs clear bilaterally, neuro exam otherwise non-focal, Will treat symptomatically, neuro eval in ED, reassess

## 2020-11-14 NOTE — ED PROVIDER NOTE - PATIENT PORTAL LINK FT
You can access the FollowMyHealth Patient Portal offered by [facility] by registering at the following website: [email or web address]. By joining [facility]’s FollowMyHealth portal, you will also be able to view your health information using other applications (apps) compatible with our system.

## 2020-11-14 NOTE — ED PROVIDER NOTE - PROGRESS NOTE DETAILS
[facility]: neuro consulted. [facility]: cleared by neurology for discharge. Patient has an appointment this upcoming Monday.

## 2020-11-14 NOTE — CONSULT NOTE ADULT - ASSESSMENT
Shira Marie is a 24 year old female with a past medical history of migraines, pituitary lesion, and gastritis presents with a headache and neck pain.    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 No signs of infection. TMJ pain may also contribute to this complex of pain.    Recs: FURTHER RECS TO FOLLOW UPON DISCUSSION WITH NEUROLOGY ATTENDING, NOT FINAL RECS  [] can stop lidocaine patch and verapamil  [] for acute pain can trial migraine cocktail of toradol 30mg IV, Reglan 10mg, Benadryl 25mg  [] may benefit from Depakote for headache but must ensure patient is not pregnant or going to become pregnant  [] may benefit from muscle relaxant for neck pain  [] continue warm compress to TMJ  [] to complete her steroid taper  [] has an appointment with her [facility] neurologist on Monday and should proceed to that appointment    FURTHER RECS TO FOLLOW UPON DISCUSSION WITH NEUROLOGY ATTENDING, NOT FINAL RECS   Shira Marie is a 24 year old female with a past medical history of migraines, pituitary lesion, and gastritis presents with a headache and neck pain.    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 No signs of infection. TMJ pain may also contribute to this complex of pain.    Recs:   [] for acute pain can trial migraine cocktail of toradol 30mg IV, Reglan 10mg, Benadryl 25mg  [] can continue home verapamil  [] can start Flexeriol 5mg bid prn for nec pain  [] continue warm compress to TMJ  [] to complete her steroid taper  [] has an appointment with her [facility] neurologist on Monday and should proceed to that appointment  [] no further imaging necessary    Case discussed with neurology attending, Dr. Vincent.    Shira Marie is a 24 year old female with a past medical history of migraines, pituitary lesion, and gastritis presents with a headache and neck pain.    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 No signs of infection. TMJ pain may also contribute to this complex of pain.    Recs:   [] for acute pain can trial migraine cocktail of toradol 30mg IV, Reglan 10mg, Benadryl 25mg  [] can continue home verapamil  [] can start Flexeril 5mg bid prn for nec pain  [] continue warm compress to TMJ  [] to complete her steroid taper  [] has an appointment with her [facility] neurologist on Monday and should proceed to that appointment  [] no further imaging necessary    Case discussed with neurology attending, Dr. Vincent.    Shira Marie is a 24 year old female with a past medical history of migraines, pituitary lesion, and gastritis presents with a headache and neck pain.    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 No signs of infection. TMJ pain may also contribute to this complex of pain.    Recs:   [] for acute pain can trial migraine cocktail of toradol 30mg IV, Reglan 10mg, Benadryl 25mg  [] can continue home verapamil  [] can start Flexeril 5mg bid prn for neck pain  [] continue warm compress to TMJ  [] to complete her steroid taper  [] has an appointment with her [facility] neurologist on Monday and should proceed to that appointment  [] no further imaging necessary    Case discussed with neurology attending, Dr. Vincent.

## 2020-11-14 NOTE — ED PROVIDER NOTE - PHYSICAL EXAMINATION
GENERAL: non-toxic appearing, but appears uncomfortable  HEAD: atraumatic, normocephalic  EYES: vision grossly intact, no conjunctivitis or discharge  EARS: hearing grossly intact  NOSE: no nasal discharge, epistaxis   CARDIAC: intermittently sinus tach on the monitor to low 100s, normal S1S2,  no appreciable murmurs, no cyanosis, cap refill < 2 seconds  PULM: no respiratory distress, oxygen saturation on RA wnl, CTAB, no crackles, rales, rhonchi, or wheezing  GI: abdomen non-distended, soft, mildly ttp in epigastrium, no guarding or rebound tenderness, no palpable masses  NEURO: + stiff neck, awake and alert, follows commands, stuttering speech, PERRLA, EOMI, no focal motor or sensory deficits, TMs clear bilaterally  MSK: spine appears normal, no joint swelling or erythema, no gross deformities of extremities, + chest wall ttp  EXT: no peripheral edema, calf tenderness, redness or swelling  SKIN: warm, dry, and intact, no rashes  PSYCH: appropriate mood and affect

## 2020-11-14 NOTE — ED PROVIDER NOTE - NSFOLLOWUPINSTRUCTIONS_ED_ALL_ED_FT
Your diagnosis: neck pain    We had our neurology team see you in the ED, and you were cleared for discharge.    Discharge instructions:    - Please follow up with your neurologist on Monday.    - Tylenol up to 650 mg every 8 hours as needed for pain and/or Motrin up to 600 mg every 6 hours as needed for pain. Take any prescribed medications as instructed: valium 5 mg every 8 hours as needed for severe pain.    - Be sure to return to the ED if you develop new or worsening symptoms. Specific signs and symptoms to be vigilant of: worsening or persistent pain.

## 2020-11-16 LAB
CULTURE RESULTS: SIGNIFICANT CHANGE UP
SPECIMEN SOURCE: SIGNIFICANT CHANGE UP

## 2020-11-17 NOTE — ED POST DISCHARGE NOTE - DETAILS
347# not in service. No answer and no VM on 516# today. - Genoveva Rivera PA-C LVM for pt to return call. - Heydi Roy PA-C Left voicemail on 516# informing that I had results to discuss with the patient, gave call back number. -Marvel Murray PA-C

## 2020-11-17 NOTE — ED POST DISCHARGE NOTE - OTHER COMMUNICATION
Pt not reachable, Billing report with patient info and instructions to call back given to red . -Marvel Murray PA-C

## 2020-11-19 LAB — MUSK IGG SER IA-MCNC: <1 U/ML — SIGNIFICANT CHANGE UP

## 2022-04-18 NOTE — ED PROVIDER NOTE - GASTROINTESTINAL NEGATIVE STATEMENT, MLM
no abdominal pain, no bloating, no constipation, no diarrhea, no nausea and no vomiting. Area H Indication Text: Tumors in this location are included in Area H (eyelids, eyebrows, nose, lips, chin, ear, pre-auricular, post-auricular, temple, genitalia, hands, feet, ankles and areola).  Tissue conservation is critical in these anatomic locations.

## 2023-01-10 NOTE — BEHAVIORAL HEALTH ASSESSMENT NOTE - NSBHCHARTREVIEWIMAGING_PSY_A_CORE FT
Comments: sent a my chart mesg for pt to make appt for June     Last Office Visit (last PCP visit):   12/29/2022    Next Visit Date:  Future Appointments   Date Time Provider Saurabh Delvalle   1/26/2023  2:00 PM Leatha Monreal MD [facility]       **If hasn't been seen in over a year OR hasn't followed up according to last diabetes/ADHD visit, make appointment for patient before sending refill to provider.     Rx requested:  Requested Prescriptions     Pending Prescriptions Disp Refills    DULoxetine (CYMBALTA) 30 MG extended release capsule [Pharmacy Med Name: DULoxetine HCl 30 MG Oral Capsule Delayed Release Particles] 90 capsule 3     Sig: TAKE 1 CAPSULE BY MOUTH  DAILY
Refill sent to pharmacy. Keep appointment as scheduled.  Thank you
EXAM:  MR BRAIN WAW IC                            PROCEDURE DATE:  11/05/2020            INTERPRETATION:  CLINICAL INFORMATION: Patient presenting with acute onset stuttering right hand tremor with the recent history of weakness. Brought in by EMS is Code Stroke on 11/4/2020.    TECHNIQUE: MRI of the brain was performed with and without contrast. Sagittal and axial T1, axial T2, FLAIR, SWI, diffusion-weighted images and an ADC map were obtained.    7.5 cc of Gadavist was injected, with 0 cc discarded.    COMPARISON: 11/4/2020    FINDINGS:  There is no intraparenchymal hematoma, mass effect or midline shift. No abnormal extra-axial fluid collections are present.    Age-appropriate ventricular size. No hydrocephalus. Basal cisterns are patent.    There is a focus T2/FLAIR signal hyperintensity within the left subcortical white matter at the level of the left parietal lobe (6, 16).    There is no diffusion abnormality to suggest acute or subacute infarction. Major flow-voids at the base ofthe brain follow expected course and contour.    The calvarium is intact. The visualized intraorbital compartments are within normal limits. Mucosal inclusion cyst versus polyps in bilateral maxillary sinuses. Bilateral ethmoid sinus thickening.    IMPRESSION:  Nonspecific focus of T2/FLAIR signal hyperintensity within the left parietal lobe on the images referenced above. This may represent a focus of infection inflammation demyelination or ischemia. Clinical correlation recommended.              MICHAEL OH MD; Resident Radiology  This document has been electronically signed.  PELON PEDRAZA MD; Attending Radiologist  This document has been electronically signed. Nov 5 2020 10:32AM

## 2023-02-17 NOTE — PATIENT PROFILE ADULT - VISION (WITH CORRECTIVE LENSES IF THE PATIENT USUALLY WEARS THEM):
Normal vision: sees adequately in most situations; can see medication labels, newsprint Xeljanz Counseling: I discussed with the patient the risks of Xeljanz therapy including increased risk of infection, liver issues, headache, diarrhea, or cold symptoms. Live vaccines should be avoided. They were instructed to call if they have any problems.

## 2023-05-19 NOTE — ED ADULT TRIAGE NOTE - PAIN: PRESENCE, MLM
Detail Level: Zone Plan: cont/start spf 30+ daily Plan: rec spf 30 daily with zinc oxide or titanium dioxide >5% complains of pain/discomfort

## 2023-09-20 NOTE — ED PROVIDER NOTE - TEMPLATE
7:00 PM Recheck on patient. Discussed with patient ED findings and plan for discharge. Patient was given ED warnings, discharge instructions, and follow up information to go home with. Patient understands and agrees with plan for discharge. Any questions have been answered.     Abdominal Pain, N/V/D

## 2024-01-01 NOTE — ED ADULT NURSE NOTE - EXTENSIONS OF SELF_ADULT
None clitoris and vaginal anatomy normal, absent significant discharge or tags; no masses; no hernias.

## 2024-04-24 NOTE — ED ADULT NURSE NOTE - NS PRO AD NO ADVANCE DIRECTIVE
No
Render Risk Assessment In Note?: no
Detail Level: Simple
Additional Notes: Patient consent was obtained to proceed with the visit and recommended plan of care after discussion of all risks and benefits, including the risks of COVID-19 exposure.

## 2024-06-20 NOTE — ED PROVIDER NOTE - NS ED MD SHIFT CHANGE PENDING ITEMS
ARAVIND Boles MD, FACE    [facility]   [facility]   [facility]            Reason for visit: Follow-up of hyperthyroidism        ASSESSMENT AND PLAN:    1. Hyperthyroidism  Thyroid function is normal.  Continue methimazole 10 mg daily.  Return in 6 months with repeat labs.  - methIMAzole (TAPAZOLE) 10 MG tablet; Take 1 tablet by mouth in the morning.  Dispense: 90 tablet; Refill: 3  - TSH; Future  - T4, Free; Future  - T3; Future  - Hepatic Function Panel; Future    2. Graves' disease    Follow-up and Dispositions    Return in about 6 months (around 12/20/2024).           History of Present Illness:    THYROID DYSFUNCTION  Bridget Ann is seen for follow-up of hyperthyroidism secondary to Graves' disease; this was first noted in August 2021.  He is currently treated with methimazole.  I last saw him in July 2022.  He was to have followed up with me 4 to 6 months later but did not do so.  He did not check requested labs prior to today's appointment.    Current symptoms:  See review of systems below     Prior treatment: Methimazole 20 mg daily was started 10/27/2021.  His dose has been adjusted as follows:  -10 mg daily 1/3/2022     Pertinent labs:  8/12/2021: TSH less than 0.005, free T4 2.50, free T3 8.0.  8/19/2021: Free T4 2.25, T4 11.3, antithyroid peroxidase antibodies 359 (+).  10/26/2021: TSH less than 0.005, free T4 2.50, T3 257, thyroid-stimulating immunoglobulin 0.77 (+).  12/30/2021: TSH less than 0.005, free T4 1.21, T3 126.  4/11/2022: TSH 2.620, free T4 1.09, T3 138.  8/4/2022: TSH 1.320, free T4 1.2, T3 1.27.  12/20/2023: TSH 1.080, free T4 1.1, T3 1.22.  6/18/2024: TSH 1.910, free T4 1.2, T3 1.26.     Imaging:  10/26/2021: Ultrasound ([facility])- Right lobe 2.69 x 2.24 x 6.42 cm, isthmus 0.46 cm, left lobe 1.98 x 1.95 x 5.87 cm.  Heterogeneous echotexture.  Increased blood flow.  No nodules.       Review of Systems   Constitutional:  Positive for unexpected weight change  labs/CT/MRI results